# Patient Record
Sex: MALE | Race: BLACK OR AFRICAN AMERICAN | Employment: UNEMPLOYED | URBAN - METROPOLITAN AREA
[De-identification: names, ages, dates, MRNs, and addresses within clinical notes are randomized per-mention and may not be internally consistent; named-entity substitution may affect disease eponyms.]

---

## 2019-11-13 ENCOUNTER — HOSPITAL ENCOUNTER (OUTPATIENT)
Dept: PHYSICAL THERAPY | Age: 29
Discharge: HOME | End: 2019-11-13
Payer: MEDICARE

## 2019-11-13 PROCEDURE — 97162 PT EVAL MOD COMPLEX 30 MIN: CPT

## 2019-11-13 PROCEDURE — 97110 THERAPEUTIC EXERCISES: CPT

## 2019-11-13 NOTE — PROGRESS NOTES
PT CERVICAL EVAL AND TREATMENT     Patient Name: Iain Arnold  Date:2019  : 1990  [x]  Patient  Verified  Payor: [employer] / Plan: [employer] / Product Type: Managed Care Medicaid /    In time:1150  Out time:1240  Total Treatment Time (min): 50  Visit #: 1 of     Treatment Area: Acute neck pain [M54.2]  Acute low back pain [M54.5]    SUBJECTIVE  Pain Level (0-10 scale): (C): 4 (B): 4 (W):  9  Any medication changes, allergies to medications, diagnosis change, or new procedure performed: see summary sheet for update    Chief Complaint:  Patient reports with co neck and back pain and tightness associated with falling onto his back in 2019 after getting dizzy. Patient reports doctor associates dizziness with dehydration as patient denies chronic dizziness. Patient reports he was prescribed pain meds, but with no change in pain therefore he doesn't utilize them. Patient denies another forms of pain management such as heat , ice or exercise. Patient denies frequent falls. Patient reports resolving headaches (chronic) and rib pain since fall. Patient is own historian, but lives at home with mother. Functional Deficits increased time to complete ADLs, pain with walking and lifting, pain with prolonged sitting to watch TV,  Play games etc.     OBJECTIVE  Headaches: Do you have headaches?  Yes, intermittent   Posture: CS fwd head and shoulder , TS increased kyphosis - poor seated posture     40 deg fwd head posture     Shoulder/Scapular Screen: WFL throughout - co tension at end range     Active Movements:   ROM % CS  % LS    Forward flexion Chin to chest  [place] throughout - tension and pain note   Extension 20 deg     SB right  45     SB left  45    Rotation right 30    Rotation left 30      AROM - Thoracic Spine: seated rotation - dec 25%     Palpation: TTP- B UT, CS paraspinals , L 2-4 LS paraspinals   Muscle Flexibility: fair CS    Hyper flexible quads     HS 90/90 + at 15 deg lacking   Global Muscular Weakness:    Shoulder : Flexion 4/5, ABD 4/5, ER 4/5, IR 4/5    Middle Trapezius/ Rhomboids 3+/5   Lower Trapezius 3/5  Bridge - dec 25% - co tension   Hip flexion: 5/5, ABD 4/5, extension 4+/5     Patient Education/ Therapeutic Exercise : [x] Discussed POT including PT expectation, established HEP with pictures and instruction, per FS - UBE with S sec to first attempt   (minutes) : 20    Manual: NT     Modality (rationale): decrease post treatment soreness   [x]  MHP to CS and LS 10 min     Pain Level (0-10 scale) post treatment: 4    ASSESSMENT  [x]  See Plan of Care    PLAN  [x]  Upgrade activities as tolerated      [x] Other:_POC  2-3 x 8-12    Angela Melgoza, PT 11/13/2019    Justification for Eval Code Complexity:  Patient History : posture   Examination see exam   Clinical Presentation: evolving sec to multiple spinal levels involved   Clinical Decision Making : FOTO : 51 /100

## 2019-11-13 NOTE — PROGRESS NOTES
[address] [facility] AT [address]  Phone: [phone] Fax: [phone] / STATEMENT OF MEDICAL NECESSITY FOR PHYSICAL THERAPY SERVICES  Patient Name: Jo Solitario : 1990   Medical   Diagnosis: Acute neck pain [M54.2]  Acute low back pain [M54.5] Treatment Diagnosis: Postural dysfunction - CS and LS pain    Onset Date: approx 2019      Referral Source: Michelle Arriaga MD [facility]): 2019   Prior Hospitalization: See medical history Provider #: [id number]   Prior Level of Function: Functional I    Comorbidities: Depression    Medications: Verified on Patient Summary List   The Plan of Care and following information is based on the information from the initial evaluation.   ========================================================================  Assessment / key information:  Pt is a 30y.o. year old male who presents with co neck and back pain and tightness associated with falling onto his back in 2019 after getting dizzy. Patient reports doctor associates dizziness with dehydration as patient denies chronic dizziness. Patient reports he was prescribed pain meds, but with no change in pain therefore he doesn't utilize them. Patient denies another forms of pain management such as heat , ice or exercise. Patient denies frequent falls. Patient reports resolving headaches (chronic) and rib pain since fall. Patient is own historian, but lives at home with mother. Current deficits include: increased pain to 9/10 at worst and 4/10 at best,   OBJECTIVE  Headaches: Do you have headaches?  Yes, intermittent   Posture: CS fwd head and shoulder , TS increased kyphosis - poor seated posture                40 deg fwd head posture      Shoulder/Scapular Screen: WFL throughout - co tension at end range      Active Movements:   ROM % CS  % LS    Forward flexion Chin to chest  [place] throughout - tension and pain note   Extension 20 deg      SB right  45      SB left  45     Rotation right 30     Rotation left 30        AROM - Thoracic Spine: seated rotation - dec 25%      Palpation: TTP- B UT, CS paraspinals , L 2-4 LS paraspinals   Muscle Flexibility: fair CS                          Hyper flexible quads                           HS 90/90 + at 15 deg lacking   Global Muscular Weakness:               Shoulder : Flexion 4/5, ABD 4/5, ER 4/5, IR 4/5               Middle Trapezius/ Rhomboids 3+/5              Lower Trapezius 3/5  Bridge - dec 25% - co tension   Hip flexion: 5/5, ABD 4/5, extension 4+/5   Functional deficits include: increased time to complete ADLs, pain with walking and lifting, pain with prolonged sitting to watch TV,  Play games etc. . Home exercise program initiated on initial evaluation to address these deficits.  Pt would benefit from PT to address these deficits for increased functional mobility and QOL.  ========================================================================  Eval Complexity: History: MEDIUM  Complexity : 1-2 comorbidities / personal factors will impact the outcome/ POC Exam:HIGH Complexity : 4+ Standardized tests and measures addressing body structure, function, activity limitation and / or participation in recreation  Presentation: MEDIUM Complexity : Evolving with changing characteristics  Clinical Decision Making:MEDIUM Complexity : FOTO score of 26-74Overall Complexity:MEDIUM  Problem List: pain affecting function, decrease ROM, decrease strength, decrease ADL/ functional abilitiies, decrease activity tolerance, decrease flexibility/ joint mobility and decrease transfer abilities   Treatment Plan may include any combination of the following: Therapeutic exercise, Therapeutic activities, Neuromuscular re-education, Physical agent/modality, Gait/balance training, Manual therapy, Aquatic therapy, Patient education, Self Care training, Functional mobility training, Home safety training and Stair training  Patient / Family readiness to learn indicated by: asking questions, trying to perform skills and interest  Persons(s) to be included in education: patient (P)  Barriers to Learning/Limitations: None  Measures taken:    Patient Goal (s): \"get rid of pain \"   Patient self reported health status: good  Rehabilitation Potential: good   Short Term Goals: To be accomplished in  1  weeks:  1. Pt will be independent and compliant with HEP   Long Term Goals: To be accomplished in  8-12  treatments:  1. Patient will increase FOTO score to 59/100 for indications of increased functional mobility. 2.  Patient will demo CS extension to 30 deg for improved posture with prolonged sitting   3. Patient will demo 100% bridge for improved core and glut recruitment with bending   4. Patient will demo 4/5 MT strength for improved postural strength to decreased pain with lifting   Frequency / Duration:   Patient to be seen  2-3  times per week for 8-12  treatments:  Patient / Caregiver education and instruction: self care, activity modification and exercises  Therapist Signature: Leticia Thomas PT Date: 46/09/3459   Certification Period: NA Time: 102p    ========================================================================  I certify that the above Physical Therapy Services are being furnished while the patient is under my care. I agree with the treatment plan and certify that this therapy is necessary. Physician Signature:        Date:       Time:   Please sign and return to [facility] or you may fax the signed copy to [phone]. Thank you.

## 2019-11-26 ENCOUNTER — APPOINTMENT (OUTPATIENT)
Dept: PHYSICAL THERAPY | Age: 29
End: 2019-11-26
Payer: MEDICARE

## 2019-11-27 ENCOUNTER — APPOINTMENT (OUTPATIENT)
Dept: PHYSICAL THERAPY | Age: 29
End: 2019-11-27
Payer: MEDICARE

## 2019-12-03 ENCOUNTER — HOSPITAL ENCOUNTER (OUTPATIENT)
Dept: PHYSICAL THERAPY | Age: 29
Discharge: HOME | End: 2019-12-03
Payer: MEDICARE

## 2019-12-03 PROCEDURE — 97110 THERAPEUTIC EXERCISES: CPT | Performed by: PHYSICAL THERAPIST

## 2019-12-03 PROCEDURE — 97140 MANUAL THERAPY 1/> REGIONS: CPT | Performed by: PHYSICAL THERAPIST

## 2019-12-03 NOTE — PROGRESS NOTES
PT DAILY TREATMENT NOTE     Patient Name: Tyra Arias  Date:12/3/2019  : 1990  [x]  Patient  Verified  Payor: Saray Rangel / Plan: [employer] [place] / Product Type: Managed Care Medicaid /    In time:1059am  Out time:1155  Total Treatment Time (min): 56min  Total Timed Codes (min): 41  1:1 Treatment Time (min): na   Visit #: 2 of     Treatment Area: Acute neck pain [M54.2]  Acute low back pain [M54.5]    SUBJECTIVE  Pain Level (0-10 scale): 3  Any medication changes, allergies to medications, adverse drug reactions, diagnosis change, or new procedure performed?: [x] No    [] Yes (see summary sheet for update)  Subjective functional status/changes:   [] No changes reported  Having neck pain right now.      OBJECTIVE  Modality rationale: decrease inflammation, decrease pain and increase tissue extensibility to improve the patients ability to perform functional mobility and improve activity  endurance     Min Type Additional Details    [] Estim: []Att   []Unatt  []TENS instruct                 []IFC  []Premod []NMES                       []Other:  []w/US   []w/ice   []w/heat  Position:  Location:    []  Traction: [] Cervical       []Lumbar                       [] Prone          []Supine                       []Intermittent   []Continuous Lbs:  [] before manual  [] after manual    []  Ultrasound: []Continuous   [] Pulsed                           []1MHz   []3MHz Location:  W/cm2:    []  Iontophoresis with dexamethasone         Location: [] Take home patch   [] In clinic   10 []  Ice     [x]  heat  []  Ice massage Position:reclined  Location:c/s, l/s    []  Vasopneumatic Device Pressure: [] lo [] med [] hi   Temp: [] lo [] med [] hi   [] Skin assessment post-treatment:  []intact []redness- no adverse reaction       []redness - adverse reaction:       36/31 min Therapeutic Exercise:  [] See flow sheet :   Rationale: increase ROM, increase strength and increase proprioception to improve the patients ability to perform functional mobility and improve activity  endurance      10 min Manual Therapy:  C/s PROM, stretching UT,   Rationale: decrease pain, increase ROM, increase tissue extensibility, decrease trigger points and increase postural awareness to perform functional mobility and improve activity  endurance            x min Patient Education: [x] Review HEP    [] Progressed/Changed HEP based on:   [] positioning   [] body mechanics   [] transfers   [] heat/ice application        Other Objective/Functional Measures: initiated POC  Increased  pain noted in t/s with L rotation, but ROM is WNL. Very poor sitting posture with frequent cue to correct    Chin tucks with TC for correct form  Waiters pose at wall for cues for c/s posture (head touching wall)    Pain Level (0-10 scale) post treatment: 0    ASSESSMENT/Changes in Function: Good tolerance to treatment today with patient req 100% verbal/tactile cueing and demo for proper form/technique with all newly introduced therex. Pt continues with increased resting mm tension and requires frenquent cues for diaph. Breathing and relaxation of UT mm. Adjusted some therex from [address] for increased postural cues. Patient will continue to benefit from skilled PT services to modify and progress therapeutic interventions, address functional mobility deficits, address ROM deficits, address strength deficits, analyze and address soft tissue restrictions, analyze and cue movement patterns, analyze and modify body mechanics/ergonomics, assess and modify postural abnormalities, address imbalance/dizziness and instruct in home and community integration to attain remaining goals. []  See Plan of Care  []  See progress note/recertification  []  See Discharge Summary         Progress towards goals / Updated goals:  FIRST FU  · Short Term Goals: To be accomplished in  1  weeks:  1. Pt will be independent and compliant with HEP  · Long Term Goals:  To be accomplished in  8-12  treatments:  1. Patient will increase FOTO score to 59/100 for indications of increased functional mobility. 2.  Patient will demo CS extension to 30 deg for improved posture with prolonged sitting   3. Patient will demo 100% bridge for improved core and glut recruitment with bending   4.  Patient will demo 4/5 MT strength for improved postural strength to decreased pain with lifting   PLAN  [x]  Upgrade activities as tolerated     [x]  Continue plan of care  []  Update interventions per flow sheet       []  Discharge due to:_  []  Other:_      Katherine Thompson, PT 12/3/2019  9:21 AM

## 2019-12-05 ENCOUNTER — HOSPITAL ENCOUNTER (OUTPATIENT)
Dept: PHYSICAL THERAPY | Age: 29
Discharge: HOME | End: 2019-12-05
Payer: MEDICARE

## 2019-12-05 PROCEDURE — 97110 THERAPEUTIC EXERCISES: CPT

## 2019-12-05 PROCEDURE — 97140 MANUAL THERAPY 1/> REGIONS: CPT

## 2019-12-05 NOTE — PROGRESS NOTES
PT DAILY TREATMENT NOTE     Patient Name: Caroline George  Date:2019  : 1990  [x]  Patient  Verified  Payor: Bettina Aleman / Plan: Adelina Garzon / Product Type: Managed Care Medicaid /    In time: 10:50 am         Out time: 11:53 am  Total Treatment Time (min): 63  Visit #: 3 of     Treatment Area: Acute neck pain [M54.2]  Acute low back pain [M54.5]    SUBJECTIVE  Pain Level (0-10 scale): 4  Any medication changes, allergies to medications, adverse drug reactions, diagnosis change, or new procedure performed?: [x] No    [] Yes (see summary sheet for update)  Subjective functional status/changes:   [] No changes reported  Patient reports first f/u treatment did not cause any adverse reaction, but was surprised at how difficult it was to perform the exercises.     OBJECTIVE  Modality rationale: decrease inflammation, decrease pain and increase tissue extensibility to improve the patients ability to perform functional mobility and improve activity  endurance     Min Type Additional Details    [] Estim: []Att   []Unatt  []TENS instruct                 []IFC  []Premod []NMES                       []Other:  []w/US   []w/ice   []w/heat  Position:  Location:    []  Traction: [] Cervical       []Lumbar                       [] Prone          []Supine                       []Intermittent   []Continuous Lbs:  [] before manual  [] after manual    []  Ultrasound: []Continuous   [] Pulsed                           []1MHz   []3MHz Location:  W/cm2:    []  Iontophoresis with dexamethasone         Location: [] Take home patch   [] In clinic   10 []  Ice     [x]  Heat  []  Ice massage Position:reclined  Location:c/s, l/s    []  Vasopneumatic Device Pressure: [] lo [] med [] hi   Temp: [] lo [] med [] hi   [x] Skin assessment post-treatment:  [x]intact []redness- no adverse reaction       []redness - adverse reaction:     41 min Therapeutic Exercise:  [x] See flow sheet: added BET sit<>stand, deadbugs Rationale: increase ROM, increase strength and increase proprioception to improve the patients ability to perform functional mobility and improve activity  endurance    12 min Manual Therapy:  prone PA mobs grade III to T/S f/b STM to TPS at T6-8; DTM to (B) UT f/b SOR and C/S PROM for rotation   Rationale: decrease pain, increase ROM, increase tissue extensibility, decrease trigger points and increase postural awareness to perform functional mobility and improve activity  endurance          X min Patient Education: [x] Review HEP     Other Objective/Functional Measures:  VCs for tidal breathing with bridges, ankles in DF to inc glut activation. Core strength: 80% bridge  C/S AROM: extension 44 deg, rotation (supine) WNL    Pain Level (0-10 scale) post treatment: 0    ASSESSMENT/Changes in Function:   Good progress towards set goals. Patient will continue to benefit from skilled PT services to modify and progress therapeutic interventions, address functional mobility deficits, address ROM deficits, address strength deficits, analyze and address soft tissue restrictions, analyze and cue movement patterns, analyze and modify body mechanics/ergonomics, assess and modify postural abnormalities, address imbalance/dizziness and instruct in home and community integration to attain remaining goals. [x]  See Plan of Care  []  See progress note/recertification  []  See Discharge Summary         Progress towards goals / Updated goals: · Short Term Goals: To be accomplished in  1  weeks:  1. Patient will be independent and compliant with HEP. -Goal met; pt notes compliance (12/5/19)  · Long Term Goals: To be accomplished in  8-12  treatments:  1. Patient will increase FOTO score to 59/100 for indications of increased functional mobility. 2.  Patient will demo CS extension to 30 deg for improved posture with prolonged sitting. -Goal met; pt demos 44 deg C/S extension with stiffness at end-range (12/5/19)   3. Patient will demo 100% bridge for improved core and glut recruitment with bending. -Goal progressing; 80% bridge (12/5/19)   4.   Patient will demo 4/5 MT strength for improved postural strength to decreased pain with lifting     PLAN  [x]  Upgrade activities as tolerated     [x]  Continue plan of care  []  Update interventions per flow sheet       []  Discharge due to:_  []  Other:_      Corrinne Ridge, NEVIN 12/5/2019

## 2019-12-09 ENCOUNTER — APPOINTMENT (OUTPATIENT)
Dept: PHYSICAL THERAPY | Age: 29
End: 2019-12-09
Payer: MEDICARE

## 2019-12-11 ENCOUNTER — HOSPITAL ENCOUNTER (OUTPATIENT)
Dept: PHYSICAL THERAPY | Age: 29
Discharge: HOME | End: 2019-12-11
Payer: MEDICARE

## 2019-12-11 PROCEDURE — 97110 THERAPEUTIC EXERCISES: CPT

## 2019-12-11 PROCEDURE — 97140 MANUAL THERAPY 1/> REGIONS: CPT

## 2019-12-11 NOTE — PROGRESS NOTES
PT DAILY TREATMENT NOTE     Patient Name: Desire Hampton  Date:2019  : 1990  [x]  Patient  Verified  Payor: Saul Begin / Plan: [id number] / Product Type: Managed Care Medicaid /    In time: 11:23 am         Out time: 12:13 pm  Total Treatment Time (min): 50  Visit #: 4 of     Treatment Area: Acute neck pain [M54.2]  Acute low back pain [M54.5]    SUBJECTIVE  Pain Level (0-10 scale): 2  Any medication changes, allergies to medications, adverse drug reactions, diagnosis change, or new procedure performed?: [x] No    [] Yes (see summary sheet for update)  Subjective functional status/changes:   [] No changes reported  \"I feel better when I am not here, because I feel pain when I leave here afterwards. \"    OBJECTIVE  Modality rationale: decrease inflammation, decrease pain and increase tissue extensibility to improve the patients ability to perform functional mobility and improve activity  endurance     Min Type Additional Details    [] Estim: []Att   []Unatt  []TENS instruct                 []IFC  []Premod []NMES                       []Other:  []w/US   []w/ice   []w/heat  Position:  Location:    []  Traction: [] Cervical       []Lumbar                       [] Prone          []Supine                       []Intermittent   []Continuous Lbs:  [] before manual  [] after manual    []  Ultrasound: []Continuous   [] Pulsed                           []1MHz   []3MHz Location:  W/cm2:    []  Iontophoresis with dexamethasone         Location: [] Take home patch   [] In clinic   10 []  Ice     [x]  Heat  []  Ice massage Position:reclined  Location:c/s, l/s    []  Vasopneumatic Device Pressure: [] lo [] med [] hi   Temp: [] lo [] med [] hi   [x] Skin assessment post-treatment:  [x]intact []redness- no adverse reaction       []redness - adverse reaction:     30 min Therapeutic Exercise:  [x] See flow sheet: pt arrived 23 minutes late, agreeable to abbreviated session   Rationale: increase ROM, increase strength and increase proprioception to improve the patients ability to perform functional mobility and improve activity  endurance    10 min Manual Therapy:  STM to TPS at T6-8; DTM to (B) UT f/b SOR; reassessment   Rationale: decrease pain, increase ROM, increase tissue extensibility, decrease trigger points and increase postural awareness to perform functional mobility and improve activity  endurance          X min Patient Education: [x] Review HEP     Other Objective/Functional Measures:  Subjective improvement of 40-45% in symptoms since IE reported. Improvements: prolonged walking, stair negotiation, light to moderate lifting (ie. pt notes lifting 50 lb dog without difficulty)  Deficits: heavier lifting/carrying, posture  FOTO score: 64/100 (at last assessment, 51/100)  C/S AROM: flex chin to chest, ext 38 deg, (B)SB 40 deg, (B) rot 100% AROM  (B) shoulder AROM: WFL in all directions  Scapular strength: MT (R) 4/5, (L) 4+/5  Core strength 100% bridge  Hip strength: flex (R) 4/5 (L) 4+/5, abd (R) 4+/5, (L) 5/5  Posture: FHRS  Pain: (A) 0-3    Pain Level (0-10 scale) post treatment: 0    ASSESSMENT/Changes in Function:   Discussed pain versus delayed-onset muscle soreness as pt notes discomfort presents several hours to days post-treatment. See PN; will cont for remaining scheduled treatment, then D/C to HEP. Patient will continue to benefit from skilled PT services to modify and progress therapeutic interventions, address functional mobility deficits, address ROM deficits, address strength deficits, analyze and address soft tissue restrictions, analyze and cue movement patterns, analyze and modify body mechanics/ergonomics, assess and modify postural abnormalities, address imbalance/dizziness and instruct in home and community integration to attain remaining goals.      []  See Plan of Care  [x]  See progress note/recertification (63/10/05)  []  See Discharge Summary         Progress towards goals / Updated goals:  1. Patient will increase FOTO score to 59/100 for indications of increased functional mobility. -Goal met; inc to 64/100   2. Patient will demo CS extension to 30 deg for improved posture with prolonged sitting. -Goal met; pt demos 44 deg C/S extension with stiffness at end-range (12/5/19)   3. Patient will demo 100% bridge for improved core and glut recruitment with bending. -Goal met; 100% bridge (12/5/19)   4.   Patient will demo 4/5 MT strength for improved postural strength to decreased pain with lifting. -Goal met; pt demos MT strength (R) 4/5, (L) 4+/5  PLAN  [x]  Upgrade activities as tolerated     [x]  Continue plan of care  []  Update interventions per flow sheet       []  Discharge due to:_  [x]  Other: see PN; cont for remaining scheduled appt to emphasize/finalize HEP for scapular and glut/core strength, then D/C    Karilyn Siemens, PTA 12/11/2019

## 2019-12-11 NOTE — PROGRESS NOTES
[address] [facility] AT [address] [facility], [place] [address]  Phone: [phone] Fax: [phone]  PROGRESS NOTE / [address]  Patient Name: Isaiah Lopez : 1990   Treatment/Medical Diagnosis: Acute neck pain [M54.2]  Acute low back pain [M54.5]   Referral Source: Hubert Albright MD     Date of Initial Visit: 19 Attended Visits: 4 Missed Visits: 1     SUMMARY OF TREATMENT  Pt is a 30y.o. year old male who presents with co neck and back pain and tightness associated with falling onto his back in 2019 after getting dizzy. Treatment has consisted of the following:  Therapeutic exercise, Neuromuscular re-education, Physical agent/modality, Gait/balance training, Manual therapy, Patient education, and Self Care training. CURRENT STATUS  Patient has made slow, steady progress in PT, as evidenced by increased C/S mobility, improve scapular strength, and reduced overall pain levels. Patient states his headaches have improved, which occur only 2x weekly for short durations. Patient notes his main difficulty is maintaining proper posture. Assessment as follows:  Subjective improvement of 40-45% in symptoms since IE reported.   Improvements: prolonged walking, stair negotiation, light to moderate lifting (ie. pt notes lifting 50 lb dog without difficulty)  Deficits: heavier lifting/carrying, posture  FOTO score: 64/100 (at last assessment, 51/100)  C/S AROM: flex chin to chest, ext 38 deg, (B)SB 40 deg, (B) rot 100% AROM  (B) shoulder AROM: WFL in all directions  Scapular strength: MT (R) 4/5, (L) 4+/5; LT 3/5 bilaterally  Core strength 100% bridge  Hip strength: flex (R) 4/5 (L) 4+/5, abd (R) 4+/5, (L) 5/5  Posture: FHRS  Pain: (A) 0-3    Goal/Measure of Progress   1.  Patient will increase FOTO score to 59/100 for indications of increased functional mobility. -Goal met; inc to 64/100   2.  Patient will demo CS extension to 30 deg for improved posture with prolonged sitting. -Goal met; pt demos 44 deg C/S extension with stiffness at end-range (12/5/19)   3.  Patient will demo 100% bridge for improved core and glut recruitment with bending. -Goal met; 100% bridge (12/5/19)   4.  Patient will demo 4/5 MT strength for improved postural strength to decreased pain with lifting. -Goal met; pt demos MT strength (R) 4/5, (L) 4+/5    New Goals to be achieved in __1__  treatments:  1. Patient will be (I) with finalized HEP in preparation for D/C.     RECOMMENDATIONS  Recommend cont skilled PT for remaining scheduled appt to address strength deficits, achieve stated goals, and prepare patient for D/C to HEP. If you have any questions/comments please contact us directly at [phone]. Thank you for allowing us to assist in the care of your patient. LPTA Signature: Nir Hayward, [place]  Date: 12/11/2019   PT Signature: Rebekah Benedict DPT Time: 1:07 PM   NOTE TO PHYSICIAN:  PLEASE COMPLETE THE ORDERS BELOW AND FAX TO   [facility]: [phone]. If you are unable to process this request in 24 hours please contact our office: [phone].  ___ I have read the above report and request that my patient continue as recommended.   ___ I have read the above report and request that my patient continue therapy with the following changes/special instructions:_________________________________________________________   ___ I have read the above report and request that my patient be discharged from therapy.      Physician Signature:        Date:       Time:

## 2019-12-16 ENCOUNTER — HOSPITAL ENCOUNTER (OUTPATIENT)
Dept: PHYSICAL THERAPY | Age: 29
Discharge: HOME | End: 2019-12-16
Payer: MEDICARE

## 2019-12-16 PROCEDURE — 97140 MANUAL THERAPY 1/> REGIONS: CPT | Performed by: GENERAL ACUTE CARE HOSPITAL

## 2019-12-16 PROCEDURE — 97110 THERAPEUTIC EXERCISES: CPT | Performed by: GENERAL ACUTE CARE HOSPITAL

## 2019-12-16 NOTE — PROGRESS NOTES
[address] [facility] [address] [facility] AT [address] Riky Christianson, [place], [place] [address]  Phone: [phone] Fax [phone] SUMMARY  Patient Name: Ann-Marie Solis : 1990   Treatment/Medical Diagnosis: Acute neck pain [M54.2]  Acute low back pain [M54.5]   Referral Source: Laquita Freitas MD     Date of Initial Visit: 19 Attended Visits: 5 Missed Visits: 1     SUMMARY OF TREATMENT  Pt is a 30y.o. year old male who presents with co neck and back pain and tightness associated with falling onto his back in 2019 after getting dizzy. Treatment has consisted of the following:  Therapeutic exercise, Neuromuscular re-education, Physical agent/modality, Gait/balance training, Manual therapy, Patient education, and Self Care training.     CURRENT STATUS  Patient has made slow, steady progress in PT, as evidenced by increased C/S mobility, improve scapular strength, and reduced overall pain levels. Patient states his headaches have improved, which occur only 2x weekly for short durations. Patient notes his main difficulty is maintaining proper posture. Assessment as follows   Subjective improvement of 40-45% in symptoms since IE reported. Improvements: prolonged walking, stair negotiation, light to moderate lifting (ie. pt notes lifting 50 lb dog without difficulty)  Deficits: heavier lifting/carrying, posture  FOTO score: 64/100 (at last assessment, 51/100)  C/S AROM: flex chin to chest, ext 38 deg, (B)SB 40 deg, (B) rot 100% AROM  (B) shoulder AROM: WFL in all directions  Scapular strength: MT (R) 4/5, (L) 4+/5  Core strength 100% bridge  Hip strength: flex (R) 4/5 (L) 4+/5, abd (R) 4+/5, (L) 5/5  Posture: FHRS  Pain: (A) 0-3    Updated Goals:  1. Patient will be (I) with finalized HEP in preparation for D/C.  Met; pt demonstrates proper performance all exercises on HEP 19    Previous Goals:   1.  Patient will increase FOTO score to 59/100 for indications of increased functional mobility. -Goal met; inc to 64/100   2.  Patient will demo CS extension to 30 deg for improved posture with prolonged sitting. -Goal met; pt demos 44 deg C/S extension with stiffness at end-range (12/5/19)   3.  Patient will demo 100% bridge for improved core and glut recruitment with bending. -Goal met; 100% bridge (12/5/19)   4.  Patient will demo 4/5 MT strength for improved postural strength to decreased pain with lifting. -Goal met; pt demos MT strength (R) 4/5, (L) 4+/5    RECOMMENDATIONS  Discontinue therapy. Progressing towards or have reached established goals. If you have any questions/comments please contact us directly at [phone]. Thank you for allowing us to assist in the care of your patient.   Therapist Signature: Josué Ansari PT Date: 12/16/19   Reporting Period: 11/13/19 to 12/16/19 Time: 11:13 AM

## 2019-12-16 NOTE — PROGRESS NOTES
PT DAILY TREATMENT NOTE     Patient Name: Jorge Macdonald  Date:2019  : 1990  [x]  Patient  Verified  Payor: Rosy Ronquillo / Plan: Raji Sanchez / Product Type: Managed Care Medicaid /    In time: 11:00 am          Out time: 11:51  Total Treatment Time (min): 51  Visit #: 1 of 1    Treatment Area: Acute neck pain [M54.2]  Acute low back pain [M54.5]    SUBJECTIVE  Pain Level (0-10 scale): 3/10  Any medication changes, allergies to medications, adverse drug reactions, diagnosis change, or new procedure performed?: [x] No    [] Yes (see summary sheet for update)  Subjective functional status/changes:   [] No changes reported  Pt reports feeling tired today and that his ride to therapy this morning was \"rough\" due to being bumpy. Feels okay with DC today.      OBJECTIVE  Modality rationale: decrease inflammation, decrease pain and increase tissue extensibility to improve the patients ability to perform functional mobility and improve activity  endurance     Min Type Additional Details    [] Estim: []Att   []Unatt  []TENS instruct                 []IFC  []Premod []NMES                       []Other:  []w/US   []w/ice   []w/heat  Position:  Location:    []  Traction: [] Cervical       []Lumbar                       [] Prone          []Supine                       []Intermittent   []Continuous Lbs:  [] before manual  [] after manual    []  Ultrasound: []Continuous   [] Pulsed                           []1MHz   []3MHz Location:  W/cm2:    []  Iontophoresis with dexamethasone         Location: [] Take home patch   [] In clinic   10 []  Ice     [x]  Heat  []  Ice massage Position:supine  Location:c/s, l/s    []  Vasopneumatic Device Pressure: [] lo [] med [] hi   Temp: [] lo [] med [] hi   [x] Skin assessment post-treatment:  [x]intact []redness- no adverse reaction       []redness - adverse reaction:     31 min Therapeutic Exercise:  [x] See flow sheet: Reviewed all exercises to be performed for HEP to ensure competence/proper performance. Rationale: increase ROM, increase strength and increase proprioception to improve the patients ability to perform functional mobility and improve activity  endurance     10 min Manual Therapy:  STM to (B) UT and CS paraspinals f/b SOR    Rationale: decrease pain, increase ROM, increase tissue extensibility, decrease trigger points and increase postural awareness to perform functional mobility and improve activity  endurance          X min Patient Education: [x] Review HEP  Provided patient with updated HEP per exercises on FS. Given print out with pictures and GTB for resistance training at home. Other Objective/Functional Measures:    Pain Level (0-10 scale) post treatment: 0    ASSESSMENT/Changes in Function:   See DC summary. Patient will continue to benefit from skilled PT services to modify and progress therapeutic interventions, address functional mobility deficits, address ROM deficits, address strength deficits, analyze and address soft tissue restrictions, analyze and cue movement patterns, analyze and modify body mechanics/ergonomics, assess and modify postural abnormalities, address imbalance/dizziness and instruct in home and community integration to attain remaining goals. []  See Plan of Care  []  See progress note/recertification   [x]  See Discharge Summary         Progress towards goals / Updated goals:  1. Patient will be (I) with finalized HEP in preparation for D/C. Met; competent all current exercise program 12/16/19    Previous goals:   1. Patient will increase FOTO score to 59/100 for indications of increased functional mobility. -Goal met; inc to 64/100   2. Patient will demo CS extension to 30 deg for improved posture with prolonged sitting. -Goal met; pt demos 44 deg C/S extension with stiffness at end-range (12/5/19)   3.   Patient will demo 100% bridge for improved core and glut recruitment with bending. -Goal met; 100% bridge (12/5/19)   4.   Patient will demo 4/5 MT strength for improved postural strength to decreased pain with lifting. -Goal met; pt demos MT strength (R) 4/5, (L) 4+/5  PLAN  []  Upgrade activities as tolerated     []  Continue plan of care  []  Update interventions per flow sheet       [x]  Discharge due to:_ has met all established goals    []  Other:     Jossy Cuello, PT 12/16/2019

## 2020-02-05 ENCOUNTER — HOSPITAL ENCOUNTER (OUTPATIENT)
Dept: PHYSICAL THERAPY | Age: 30
Discharge: HOME | End: 2020-02-05
Payer: MEDICARE

## 2020-02-05 PROCEDURE — 97035 APP MDLTY 1+ULTRASOUND EA 15: CPT

## 2020-02-05 PROCEDURE — 97110 THERAPEUTIC EXERCISES: CPT

## 2020-02-05 PROCEDURE — 97162 PT EVAL MOD COMPLEX 30 MIN: CPT

## 2020-02-05 NOTE — PROGRESS NOTES
PT KNEE / HIP EVAL AND TREATMENT    Patient Name: Sarmad Churchill  Date:2020  : 1990  [x]  Patient  Verified  Payor: Yann Ann / Plan: Shaila Leaver / Product Type: Managed Care Medicaid /    In time:1213  Out time:1251  Total Treatment Time (min): 48  Total timed codes: 23  1:1 times: 38   Visit #: 1 of     Treatment Area: Chronic pain of left knee [M25.562, G89.29]    SUBJECTIVE  Pain Level (0-10 scale): (C):4  (B):1  (W):  9  Any medication changes, allergies to medications, diagnosis change, or new procedure performed: see summary sheet for update  Subjective functional status/changes:  CHIEF COMPLAINT: Patient reports with co L anterior knee pain, weakness and tenderness starting over 10+ years ago after getting his leg hit by a truck door and patient reports progressive worsening over the past few years. Pain management via wrapping with ace bandage and elevate with rest. Patient is pending xrays today. Hx of fall in January sec to knee buckling.    DEFICITS: walking, bending and lifting, intermittent sleep disruption, stair negotiation (4 DUNG), rec activities: walking dog    OBJECTIVE  Posture: Supine - B hip IR and squinting patella   Gait:  Antalgic     ROM :  Hip : flexion: 100 deg   Knee 0-136 with end range pain   Ankle: WFL , with noted GSC tightness (knee flexion with ankle DF)    Strength : R grossly 5/5   Hip: flexion 3+, ABD 3+ extension 3+  Knee: flexion 4, extension 3 with pain , quad lag : min   Core: bridge 100%, plank NT     Flexibility:   Hamstrings:  90/90 HS test +  Quadriceps: Fauzia Test negative   Gastroc:   +    Palpation: TTP all ant knee - peripatellar area   Edema around patella      Patella:  Patellar Positioning (Static) Squinting   Patellar Tracking WNL      Patellar Mobility WNL - guarded sec to pain        Other tests/comments:  Squat - 75 deg - unable to push to stand without UE assistance   SLS : 5 sec L, 30 sec R       Patient Education/ Therapeutic Exercise : [x] Discussed POT including PT expectation, established HEP (formulated while patient was on ice)  with pictures and instruction  (minutes) : 15    Manual:NI     Modality (rationale): decrease localized edema around patellar ligament to decrease pain with walking, stairs and general bending   [x]  US - pulsed 50% to L ant knee - 8 min with set up .8 w/cm2, 1.0 mghz  Ice 10 min (while PT formulated HEP )      Pain Level (0-10 scale) post treatment: 2  ASSESSMENT  [x]  See Plan of Care    PLAN  [x]  Upgrade activities as tolerated     [x] Other:_ POC  2-3 x 8-12    Carolina Burden, PT 2/5/2020     Justification for Eval Code Complexity:  Patient History : chronicity   Examination see exam   Clinical Presentation: evolving   Clinical Decision Making : FOTO : 30 /100

## 2020-02-05 NOTE — PROGRESS NOTES
[address] [facility] AT [address] [place], [place]  Phone: [phone] Fax: [phone] / [address] [place] [address] [facility]  Patient Name: Zulema Quispe : 1990   Medical   Diagnosis: Chronic pain of left knee [M25.562, G89.29] Treatment Diagnosis: L knee PFS    Onset Date: approx 10 years ago      Referral Source: Ignacia Valenzuela MD Start of Care [facility]): 2020   Prior Hospitalization: See medical history Provider #: [id number]   Prior Level of Function: Functional I, no AD, lives with mom   Comorbidities: Depression, possible MR    Medications: Verified on Patient Summary List   The Plan of Care and following information is based on the information from the initial evaluation.   ========================================================================  Assessment / key information:  Pt is a 30y.o. year old male who presents with co L anterior knee pain, weakness and tenderness starting over 10+ years ago after getting his leg hit by a truck door and patient reports progressive worsening over the past few years. Pain management via wrapping with ace bandage and elevate with rest. Patient is pending xrays today. Hx of fall in January sec to knee buckling.  .  Current deficits include: increased pain to 9/10 at worst and 1/10 at best,   Posture:         Supine - B hip IR and squinting patella   Gait:                Antalgic      ROM :  Hip : flexion: 100 deg   Knee 0-136 with end range pain   Ankle: WFL , with noted GSC tightness (knee flexion with ankle DF)     Strength : R grossly 5/5   Hip: flexion 3+, ABD 3+ extension 3+  Knee: flexion 4, extension 3 with pain , quad lag : min   Core: bridge 100%, plank NT      Flexibility:   Hamstrings:  90/90 HS test +  Quadriceps: Fauzia Test negative   Gastroc:   +     Palpation: TTP all ant knee - peripatellar area              Edema around patella    Patella:  Patellar Positioning (Static) Squinting   Patellar Tracking WNL      Patellar Mobility WNL - guarded sec to pain        Other tests/comments:  Squat - 75 deg - unable to push to stand without UE assistance   SLS : 5 sec L, 30 sec R     Functional deficits include: walking, bending and lifting, intermittent sleep disruption, stair negotiation (4 DUNG), rec activities: walking dog. Home exercise program initiated on initial evaluation to address these deficits.  Pt would benefit from PT to address these deficits for increased functional mobility and QOL.  ========================================================================  Eval Complexity: History: MEDIUM  Complexity : 1-2 comorbidities / personal factors will impact the outcome/ POC Exam:HIGH Complexity : 4+ Standardized tests and measures addressing body structure, function, activity limitation and / or participation in recreation  Presentation: MEDIUM Complexity : Evolving with changing characteristics  Clinical Decision Making:MEDIUM Complexity : FOTO score of 26-74Overall Complexity:MEDIUM  Problem List: pain affecting function, decrease ROM, decrease strength, edema affecting function, impaired gait/ balance, decrease ADL/ functional abilitiies, decrease activity tolerance, decrease flexibility/ joint mobility and decrease transfer abilities   Treatment Plan may include any combination of the following: Therapeutic exercise, Therapeutic activities, Neuromuscular re-education, Physical agent/modality, Gait/balance training, Manual therapy, Aquatic therapy, Patient education, Self Care training, Functional mobility training, Home safety training and Stair training  Patient / Family readiness to learn indicated by: asking questions, trying to perform skills and interest  Persons(s) to be included in education: patient (P)  Barriers to Learning/Limitations: None  Measures taken:    Patient Goal (s): \"pain relief \"   Patient self reported health status: good  Rehabilitation Potential: good   Short Term Goals: To be accomplished in  1  weeks:  1. Pt will be independent and compliant with HEP   Long Term Goals: To be accomplished in  8-12  treatments:  1. Patient will increase FOTO score to 59/100 for indications of increased functional mobility. 2.  Patient will demo 90 deg squat and stand for progression to bending activities around the home   3. Patient will demo 4/5 knee extension strength for ease with stair negotiation   4. Patient will demo 15 sec SLS for ease with SL ADLs with decreased fall risk   Frequency / Duration:   Patient to be seen  2-3  times per week for 8-12  treatments:  Patient / Caregiver education and instruction: self care, activity modification and exercises  Therapist Signature: Nitin Bower PT Date: 9/7/8954   Certification Period: NA Time: 3100D   ========================================================================  I certify that the above Physical Therapy Services are being furnished while the patient is under my care. I agree with the treatment plan and certify that this therapy is necessary. Physician Signature:        Date:       Time:   Please sign and return to [facility] or you may fax the signed copy to [phone]. Thank you.

## 2020-02-12 ENCOUNTER — HOSPITAL ENCOUNTER (OUTPATIENT)
Dept: PHYSICAL THERAPY | Age: 30
Discharge: HOME | End: 2020-02-12
Payer: MEDICARE

## 2020-02-12 PROCEDURE — 97110 THERAPEUTIC EXERCISES: CPT

## 2020-02-12 PROCEDURE — 97035 APP MDLTY 1+ULTRASOUND EA 15: CPT

## 2020-02-12 NOTE — PROGRESS NOTES
PT DAILY TREATMENT NOTE     Patient Name: Giuseppe Kirk  Date:2020  : 1990  [x]  Patient  Verified  Payor: [place] MEDICARE / Plan: [place] MEDICARE PART A & B / Product Type: Medicare /    In time:1130  Out time:1214  Total Treatment Time (min): 44  Total Timed Codes (min): 34  1:1 Treatment Time (min): 5280- 7270 (24)   Visit #: 2 of 8-10    Treatment Area: Chronic pain of left knee [M25.562, G89.29]    SUBJECTIVE  Pain Level (0-10 scale): 5-6  Any medication changes, allergies to medications, adverse drug reactions, diagnosis change, or new procedure performed?: [x] No    [] Yes (see summary sheet for update)  Subjective functional status/changes:   \"Pain\"    OBJECTIVE  Modality rationale: decrease inflammation and decrease pain to improve the patients ability to decrease pain with daily activities    Min Type Additional Details    [] Estim: []Att   []Unatt        []TENS instruct                  []IFC  []Premod   []NMES                     []Other:  []w/US   []w/ice   []w/heat  Position:  Location:    []  Traction: [] Cervical       []Lumbar                       [] Prone          []Supine                       []Intermittent   []Continuous Lbs:  [] before manual  [] after manual   8 [x]  Ultrasound: []Continuous   [x] Pulsed - 50%                           []1MHz   []3MHz Location: L knee  W/cm2: .8    []  Iontophoresis with dexamethasone         Location: [] Take home patch   [] In clinic   10 [x]  Ice     []  heat  []  Ice massage Position: long sitting   Location: L knee     []  Vasopneumatic Device Pressure:       [] lo [] med [] hi   Temperature: [] lo [] med [] hi   [x] Skin assessment post-treatment:  [x]intact []redness- no adverse reaction       []redness - adverse reaction:       26 min Therapeutic Exercise:  [x] See flow sheet :Initiated ther ex per flow sheet    Rationale: increase ROM, increase strength, improve coordination and improve balance to improve the patients ability to progress to increased activity tolerance with decreased pain     X with TE/MT min Patient Education: [x] Review HEP from IE        Other Objective/Functional Measures: Therex initiated today - first FU post eval.      Pain Level (0-10 scale) post treatment: 5    ASSESSMENT/Changes in Function: Patient tolerated initiation of therex fair sec to increased pain levels. CO ant knee and calf pain today. 100% VC for form and technique for all newly introduced therex. PT felt one occurrence of popping with knee flexion during TE - likely intermittent tracking concerns. Patient may benefit from more VMO strengthening . General poor quad tone overall however. Patient will continue to benefit from skilled PT services to modify and progress therapeutic interventions, address functional mobility deficits, address ROM deficits, address strength deficits, analyze and address soft tissue restrictions, analyze and cue movement patterns, analyze and modify body mechanics/ergonomics and assess and modify postural abnormalities to attain remaining goals. [x]  See Plan of Care  []  See progress note/recertification  []  See Discharge Summary         Progress towards goals / Updated goals:  FIRST FU TREATMENT - CONT PER POT TO EST GOALS 2/12/2020   · Short Term Goals: To be accomplished in  1  weeks:  1. Pt will be independent and compliant with HEP  · Long Term Goals: To be accomplished in  8-12  treatments:  1. Patient will increase FOTO score to 59/100 for indications of increased functional mobility. 2.  Patient will demo 90 deg squat and stand for progression to bending activities around the home   3. Patient will demo 4/5 knee extension strength for ease with stair negotiation   4.  Patient will demo 15 sec SLS for ease with SL ADLs with decreased fall risk     PLAN  [x]  Upgrade activities as tolerated     []  Continue plan of care  [x]  Update interventions per flow sheet       []  Discharge due to:_  [x] Other:_assess response to first FU treatment       Tacho Score, PT 2/12/2020

## 2020-02-18 ENCOUNTER — HOSPITAL ENCOUNTER (OUTPATIENT)
Dept: PHYSICAL THERAPY | Age: 30
Discharge: HOME | End: 2020-02-18
Payer: MEDICARE

## 2020-02-18 PROCEDURE — 97110 THERAPEUTIC EXERCISES: CPT

## 2020-02-18 PROCEDURE — 97035 APP MDLTY 1+ULTRASOUND EA 15: CPT

## 2020-02-18 NOTE — PROGRESS NOTES
PT DAILY TREATMENT NOTE     Patient Name: Dottie Jiménez  Date:2020  : 1990  [x]  Patient  Verified  Payor: [place] MED[place]ARE / Plan: [place] MEDICARE PART A & B / Product Type: Medicare /    In time: 11:00 am          Out time: 12:00 pm  Total Treatment Time (min): 60  Total Timed Codes (min): 50  1:1 Treatment Time (min): 11:22am-11:50am (33)  Visit #: 3 of 8-10    Treatment Area: Chronic pain of left knee [M25.562, G89.29]    SUBJECTIVE  Pain Level (0-10 scale): 5  Any medication changes, allergies to medications, adverse drug reactions, diagnosis change, or new procedure performed?: [x] No    [] Yes (see summary sheet for update)  Subjective functional status/changes:   Patient states he is still able to walk his normal distances to the grocery and to donate plasma, but requires additional time due to his slower pace. He states he does not use any AD for walking, but is considering using a cane. He notes x-ray results came back normal with no body abnormalities.     OBJECTIVE  Modality rationale: decrease inflammation and decrease pain to improve the patients ability to decrease pain with daily activities    Min Type Additional Details    [] Estim: []Att   []Unatt        []TENS instruct                  []IFC  []Premod   []NMES                     []Other:  []w/US   []w/ice   []w/heat  Position:  Location:    []  Traction: [] Cervical       []Lumbar                       [] Prone          []Supine                       []Intermittent   []Continuous Lbs:  [] before manual  [] after manual   8 [x]  Ultrasound: []Continuous   [x] Pulsed - 50%                           []1MHz   []3MHz Location: L knee  W/cm2: .8    []  Iontophoresis with dexamethasone         Location: [] Take home patch   [] In clinic   10 [x]  Ice     []  heat  []  Ice massage Position: long sitting   Location: L knee     []  Vasopneumatic Device Pressure:       [] lo [] med [] hi   Temperature: [] lo [] med [] hi   [x] Skin assessment post-treatment:  [x]intact []redness- no adverse reaction       []redness - adverse reaction:       42 min Therapeutic Exercise:  [x] See flow sheet: added standing HR/TR, MSR GT, foam ROM, and standing TKE with RTB   Rationale: increase ROM, increase strength, improve coordination and improve balance to improve the patients ability to progress to increased activity tolerance with decreased pain     X with TE/MT min Patient Education: [x] Review HEP; cont to encourage inc'd HEP compliance to progress strength reg'd to achieve patient's goals     Other Objective/Functional Measures:    Full TKE with LAQ, no c/o popping today. Good TKE with standing. MSR GT: (B) 30\" each    Pain Level (0-10 scale) post treatment: 6    ASSESSMENT/Changes in Function:   Patient demos poor tolerance to static standing challenges. Patient able to demo instances of normalized gait in clinic, but overall amb's with moderate antalgia. Decreased overall quad tone. Patient will continue to benefit from skilled PT services to modify and progress therapeutic interventions, address functional mobility deficits, address ROM deficits, address strength deficits, analyze and address soft tissue restrictions, analyze and cue movement patterns, analyze and modify body mechanics/ergonomics and assess and modify postural abnormalities to attain remaining goals. [x]  See Plan of Care  []  See progress note/recertification  []  See Discharge Summary         Progress towards goals / Updated goals: · Short Term Goals: To be accomplished in  1  weeks:  1. Pt will be independent and compliant with HEP  · Long Term Goals: To be accomplished in  8-12  treatments:  1. Patient will increase FOTO score to 59/100 for indications of increased functional mobility. 2.  Patient will demo 90 deg squat and stand for progression to bending activities around the home   3. Patient will demo 4/5 knee extension strength for ease with stair negotiation   4. Patient will demo 15 sec SLS for ease with SL ADLs with decreased fall risk.  -Goal progressing; MSR GT 30 seconds (2/18/20)     PLAN  [x]  Upgrade activities as tolerated     [x]  Continue plan of care  [x]  Update interventions per flow sheet       []  Discharge due to:_  []  Other:_    Jp Yates, PTA 2/18/2020

## 2020-02-20 ENCOUNTER — HOSPITAL ENCOUNTER (OUTPATIENT)
Dept: PHYSICAL THERAPY | Age: 30
Discharge: HOME | End: 2020-02-20
Payer: MEDICARE

## 2020-02-20 PROCEDURE — 97035 APP MDLTY 1+ULTRASOUND EA 15: CPT

## 2020-02-20 PROCEDURE — 97110 THERAPEUTIC EXERCISES: CPT

## 2020-02-20 NOTE — PROGRESS NOTES
PT DAILY TREATMENT NOTE     Patient Name: Dottie Jiménez  Date:2020  : 1990  [x]  Patient  Verified  Payor: [place] MEDICARE / Plan: [place] MEDICARE PART A & B / Product Type: Medicare /    In time: 11:00 am         Out time: 11:54 pm  Total Treatment Time (min): 54  Total Timed Codes (min): 44  1:1 Treatment Time (min): 11:20am-11:44am (24)  Visit #: 4 of 8-10    Treatment Area: Chronic pain of left knee [M25.562, G89.29]    SUBJECTIVE  Pain Level (0-10 scale): 5  Any medication changes, allergies to medications, adverse drug reactions, diagnosis change, or new procedure performed?: [x] No    [] Yes (see summary sheet for update)  Subjective functional status/changes:   Patient notes inc'd discomfort from walking the dog this morning. He notes no adverse reaction following last tx.     OBJECTIVE  Modality rationale: decrease inflammation and decrease pain to improve the patients ability to decrease pain with daily activities    Min Type Additional Details    [] Estim: []Att   []Unatt        []TENS instruct                  []IFC  []Premod   []NMES                     []Other:  []w/US   []w/ice   []w/heat  Position:  Location:    []  Traction: [] Cervical       []Lumbar                       [] Prone          []Supine                       []Intermittent   []Continuous Lbs:  [] before manual  [] after manual   8 [x]  Ultrasound: []Continuous   [x] Pulsed - 50%                           []1MHz   [x]3MHz Location: L knee  W/cm2: 1.0    []  Iontophoresis with dexamethasone         Location: [] Take home patch   [] In clinic   10 [x]  Ice     []  heat  []  Ice massage Position: long sitting   Location: L knee     []  Vasopneumatic Device Pressure:       [] lo [] med [] hi   Temperature: [] lo [] med [] hi   [x] Skin assessment post-treatment:  [x]intact []redness- no adverse reaction       []redness - adverse reaction:       36 min Therapeutic Exercise:  [x] See flow sheet: progressed to tandem standin Rationale: increase ROM, increase strength, improve coordination and improve balance to improve the patients ability to progress to increased activity tolerance with decreased pain     X with TE/MT min Patient Education: [x] Review HEP     Other Objective/Functional Measures:        Pain Level (0-10 scale) post treatment: 1    ASSESSMENT/Changes in Function:   Patient cont to be challenged with static balance tasks. Able to achieve full knee extension and mobility. Good response to US at last tx, therefore, cont today with (+) results. Patient will continue to benefit from skilled PT services to modify and progress therapeutic interventions, address functional mobility deficits, address ROM deficits, address strength deficits, analyze and address soft tissue restrictions, analyze and cue movement patterns, analyze and modify body mechanics/ergonomics and assess and modify postural abnormalities to attain remaining goals. [x]  See Plan of Care  []  See progress note/recertification  []  See Discharge Summary         Progress towards goals / Updated goals: · Short Term Goals: To be accomplished in  1  weeks:  1. Pt will be independent and compliant with HEP. -Goal progressing; pt notes compliance (2/20/20)  · Long Term Goals: To be accomplished in  8-12  treatments:  1. Patient will increase FOTO score to 59/100 for indications of increased functional mobility. 2.  Patient will demo 90 deg squat and stand for progression to bending activities around the home   3. Patient will demo 4/5 knee extension strength for ease with stair negotiation   4. Patient will demo 15 sec SLS for ease with SL ADLs with decreased fall risk.  -Goal progressing; MSR GT 30 seconds (2/18/20)     PLAN  [x]  Upgrade activities as tolerated     [x]  Continue plan of care  [x]  Update interventions per flow sheet       []  Discharge due to:_  []  Other:_    Erika Valdez, PTA 2/20/2020

## 2020-02-24 ENCOUNTER — HOSPITAL ENCOUNTER (OUTPATIENT)
Dept: PHYSICAL THERAPY | Age: 30
End: 2020-02-24
Payer: MEDICARE

## 2020-02-26 ENCOUNTER — HOSPITAL ENCOUNTER (OUTPATIENT)
Dept: PHYSICAL THERAPY | Age: 30
Discharge: HOME | End: 2020-02-26
Payer: MEDICARE

## 2020-02-26 PROCEDURE — 97035 APP MDLTY 1+ULTRASOUND EA 15: CPT

## 2020-02-26 PROCEDURE — 97110 THERAPEUTIC EXERCISES: CPT

## 2020-02-26 NOTE — PROGRESS NOTES
PT DAILY TREATMENT NOTE     Patient Name: Gee Munson  Date:2020  : 1990  [x]  Patient  Verified  Payor: Patrick Solomon / Plan: [place] MEDICARE PART A & B / Product Type: Medicare /    In time: 5982       Out time: 1150  Total Treatment Time (min): 51  Total Timed Codes (min): 41  1:1 Treatment Time (min): 38 (6522 - 0946 )  Visit #: 4 of 8-10    Treatment Area: Chronic pain of left knee [M25.562, G89.29]    SUBJECTIVE  Pain Level (0-10 scale): 4-5  Any medication changes, allergies to medications, adverse drug reactions, diagnosis change, or new procedure performed?: [x] No    [] Yes (see summary sheet for update)  Subjective functional status/changes:   Patient reports no change in overall pain levels.      OBJECTIVE  Modality rationale: decrease inflammation and decrease pain to improve the patients ability to decrease pain with daily activities    Min Type Additional Details    [] Estim: []Att   []Unatt        []TENS instruct                  []IFC  []Premod   []NMES                     []Other:  []w/US   []w/ice   []w/heat  Position:  Location:    []  Traction: [] Cervical       []Lumbar                       [] Prone          []Supine                       []Intermittent   []Continuous Lbs:  [] before manual  [] after manual   8 [x]  Ultrasound: []Continuous   [x] Pulsed - 50%                           []1MHz   [x]3MHz Location: L knee in sitting   W/cm2: 1.0    []  Iontophoresis with dexamethasone         Location: [] Take home patch   [] In clinic   10 [x]  Ice     []  heat  []  Ice massage Position: long sitting   Location: L knee     []  Vasopneumatic Device Pressure:       [] lo [] med [] hi   Temperature: [] lo [] med [] hi   [x] Skin assessment post-treatment:  [x]intact []redness- no adverse reaction       []redness - adverse reaction:       33 min Therapeutic Exercise:  [x] See flow sheet: Supervision by PT on bike     Rationale: increase ROM, increase strength, improve coordination and improve balance to improve the patients ability to progress to increased activity tolerance with decreased pain     X with TE/MT min Patient Education: [x] Review HEP     Other Objective/Functional Measures:    LTG 2 - squat test : unable to assess sec to pain   PRE progression- standing HS curls and leg press   % improvement 0%   TKE in standing : full with RTB     Pain Level (0-10 scale) post treatment: 5    ASSESSMENT/Changes in Function:   Patient demo antalgic gait after bike and reports increased pain of unknown cause. Patient demo difficulty holding knee in full extension sec to \"jumping. \" Performed US in sitting to aid in comfortable position. Patient was able to perform all exercise program including PRE progression. Patient will continue to benefit from skilled PT services to modify and progress therapeutic interventions, address functional mobility deficits, address ROM deficits, address strength deficits, analyze and address soft tissue restrictions, analyze and cue movement patterns, analyze and modify body mechanics/ergonomics and assess and modify postural abnormalities to attain remaining goals. [x]  See Plan of Care  []  See progress note/recertification  []  See Discharge Summary         Progress towards goals / Updated goals: · Short Term Goals: To be accomplished in  1  weeks:  1. Pt will be independent and compliant with HEP. -Goal progressing; pt notes compliance (2/20/20)  · Long Term Goals: To be accomplished in  8-12  treatments:  1. Patient will increase FOTO score to 59/100 for indications of increased functional mobility. 2.  Patient will demo 90 deg squat and stand for progression to bending activities around the home  goal not assessed as planned sec to elevated pain levels  2/26/2020  3. Patient will demo 4/5 knee extension strength for ease with stair negotiation   4. Patient will demo 15 sec SLS for ease with SL ADLs with decreased fall risk.  -Goal progressing; MSR GT 30 seconds (2/18/20)     PLAN  [x]  Upgrade activities as tolerated     [x]  Continue plan of care  [x]  Update interventions per flow sheet       []  Discharge due to:_  []  Other:_Patient has 2 more apts scheduled for next week      Brad Dumont, PT 2/26/2020      Future Appointments   Date Time Provider Krysten Catalan   2/26/2020 11:00 AM Sarah Miller., PT [facility]   3/2/2020 12:00 PM Sarah Miller., PT [facility]   3/4/2020 11:30 AM Murleen Cogan, Irby Catchings., PT [facility]

## 2020-03-02 ENCOUNTER — HOSPITAL ENCOUNTER (OUTPATIENT)
Dept: PHYSICAL THERAPY | Age: 30
Discharge: HOME | End: 2020-03-02
Payer: MEDICARE

## 2020-03-02 PROCEDURE — 97140 MANUAL THERAPY 1/> REGIONS: CPT

## 2020-03-02 PROCEDURE — 97014 ELECTRIC STIMULATION THERAPY: CPT

## 2020-03-02 PROCEDURE — 97110 THERAPEUTIC EXERCISES: CPT

## 2020-03-02 NOTE — PROGRESS NOTES
PT DAILY TREATMENT NOTE     Patient Name: Vishnu Diez  Date:3/2/2020  : 1990  [x]  Patient  Verified  Payor: [place] MEDICARE / Plan: [place] MEDICARE PART A & B / Product Type: Medicare /    In time: 5074 Out time: 1251  Total Treatment Time (min): 53  Total Timed Codes (min): 43  1:1 Treatment Time (min): 43  Visit #: 6 of 8-10    Treatment Area: Chronic pain of left knee [M25.562, G89.29]    SUBJECTIVE  Pain Level (0-10 scale): 5  Any medication changes, allergies to medications, adverse drug reactions, diagnosis change, or new procedure performed?: [x] No    [] Yes (see summary sheet for update)  Subjective functional status/changes:   Patient reports \"I've been better. \".      OBJECTIVE  Modality rationale: decrease inflammation and decrease pain to improve the patients ability to decrease pain with daily activities    Min Type Additional Details   10 [x] Estim: IFC   []w/US   [x]w/ice   []w/heat  Position: long sitting   Location: L ant knee     []  Traction: [] Cervical       []Lumbar                       [] Prone          []Supine                       []Intermittent   []Continuous Lbs:  [] before manual  [] after manual   H [x]  Ultrasound: []Continuous   [x] Pulsed - 50%                           []1MHz   [x]3MHz Location: L knee in sitting   W/cm2: 1.0    []  Iontophoresis with dexamethasone         Location: [] Take home patch   [] In clinic    [x]  Ice     []  heat  []  Ice massage Position: long sitting   Location: L knee     []  Vasopneumatic Device Pressure:       [] lo [] med [] hi   Temperature: [] lo [] med [] hi   [x] Skin assessment post-treatment:  [x]intact []redness- no adverse reaction       []redness - adverse reaction:       43 min Therapeutic Exercise:  [x] See flow sheet: Reassess    Rationale: increase ROM, increase strength, improve coordination and improve balance to improve the patients ability to progress to increased activity tolerance with decreased pain     X with TE/MT min Patient Education: [x] Review HEP     Other Objective/Functional Measures:    Goals assessed   Pain at best 3-4/10, at worst 5/10  Subjective % improvement 0%  Objective:   AROM L knee 0-134  L knee strength: flexion 4, extension 4 - inaccurate in the presence of pain   SLS : 9 sec  : painful   Squat : 90 deg but with significant pain  Gait - antalgic with poor wt acceptance on L LE   B pes cavus and poor footwear   Improvements: none noted    Deficits pain with walking, sitting, standing, bending , lifting       Pain Level (0-10 scale) post treatment: 4    ASSESSMENT/Changes in Function:   Patient reassessed for upcoming PN on Wed. Held US sec to no changes in pain levels. Initiated ES for pain relief. Patient reports he feels like he can do most things but with feeling of weakness or discomfort. Patient greatly favors with L LE sec to pain leading to weakness. Patient encouraged to see if he can schedule a FU with referring MD earlier than March 31 for possible additional imaging. Patient also presents with poor foot wear and B pes cavus. Patient reports he is supposed to get new shoes tomorrow, but could also benefit from B arch supports. Patient will continue to benefit from skilled PT services to modify and progress therapeutic interventions, address functional mobility deficits, address ROM deficits, address strength deficits, analyze and address soft tissue restrictions, analyze and cue movement patterns, analyze and modify body mechanics/ergonomics and assess and modify postural abnormalities to attain remaining goals. [x]  See Plan of Care  []  See progress note/recertification  []  See Discharge Summary         Progress towards goals / Updated goals: · Short Term Goals: To be accomplished in  1  weeks:  1. Pt will be independent and compliant with HEP. -Goal met - patient reports daily compliance with HEP (HEP est at IE)  · Long Term Goals:  To be accomplished in  8-12 treatments:  1. Patient will increase FOTO score to 59/100 for indications of increased functional mobility. Goal not met but progressed at 39/100  (30/100 at IE)  2.  Patient will demo 90 deg squat and stand for progression to bending activities around the home  Goal met - patient cant squat to 90 deg but with increased pain (75deg at IE0  3. Patient will demo 4/5 knee extension strength for ease with stair negotiation Goal not met but progressed at 4-/5 (3/5 at IE)   4. Patient will demo 15 sec SLS for ease with SL ADLs with decreased fall risk.  -Goal not met but progressed at 9 sec (5 sec at IE)    PLAN  [x]  Upgrade activities as tolerated     [x]  Continue plan of care  [x]  Update interventions per flow sheet       []  Discharge due to:_  []  Other:_assess response to ES and plan for continuation vs [facility] Jorge, PT 3/2/2020      Future Appointments   Date Time Provider Krysten Catalan   3/2/2020 12:00 PM Leticia Roberts., PT [facility]   3/4/2020 11:30 AM Soham Flores., PT [facility]

## 2020-03-04 ENCOUNTER — HOSPITAL ENCOUNTER (OUTPATIENT)
Dept: PHYSICAL THERAPY | Age: 30
Discharge: HOME | End: 2020-03-04
Payer: MEDICARE

## 2020-03-04 PROCEDURE — 97110 THERAPEUTIC EXERCISES: CPT

## 2020-03-04 PROCEDURE — 97014 ELECTRIC STIMULATION THERAPY: CPT
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
## 2020-03-04 NOTE — PROGRESS NOTES
PT DAILY TREATMENT NOTE     Patient Name: Albaro Baez  Date:3/4/2020  : 1990  [x]  Patient  Verified  Payor: Christine Lincoln / Plan: [place] & B / Product Type: Medicare /    In time: 9489 Out time: 2752  Total Treatment Time (min): 49  Total Timed Codes (min): 39  1:1 Treatment Time (min): 0492 - 6933 (16)  Visit #: 7 of 8-10    Treatment Area: Chronic pain of left knee [M25.562, G89.29]    SUBJECTIVE  Pain Level (0-10 scale): 4.5  Any medication changes, allergies to medications, adverse drug reactions, diagnosis change, or new procedure performed?: [x] No    [] Yes (see summary sheet for update)  Subjective functional status/changes:   Patient reports walking better after getting new shoes.       OBJECTIVE  Modality rationale: decrease inflammation and decrease pain to improve the patients ability to decrease pain with daily activities    Min Type Additional Details   10 [x] Estim: IFC   []w/US   [x]w/ice   []w/heat  Position: long sitting   Location: L ant knee     []  Traction: [] Cervical       []Lumbar                       [] Prone          []Supine                       []Intermittent   []Continuous Lbs:  [] before manual  [] after manual   H [x]  Ultrasound: []Continuous   [x] Pulsed - 50%                           []1MHz   [x]3MHz Location: L knee in sitting   W/cm2: 1.0    []  Iontophoresis with dexamethasone         Location: [] Take home patch   [] In clinic    [x]  Ice     []  heat  []  Ice massage Position: long sitting   Location: L knee     []  Vasopneumatic Device Pressure:       [] lo [] med [] hi   Temperature: [] lo [] med [] hi   [x] Skin assessment post-treatment:  [x]intact []redness- no adverse reaction       []redness - adverse reaction:       39 min Therapeutic Exercise:  [x] See flow sheet:     Rationale: increase ROM, increase strength, improve coordination and improve balance to improve the patients ability to progress to increased activity tolerance with decreased pain     X with TE/MT min Patient Education: [x] Review HEP - discussed DC sec to lack of progress and referred back to MD      Other Objective/Functional Measures:    Objective Assessment complete last session     Pain Level (0-10 scale) post treatment: 4    ASSESSMENT/Changes in Function:   Patient reports slight improvement in pain with new footwear. Patient got MD apt moved up to 3/17 to reassessed other interventions sec to limited change in pain and sx since IE. Patient reports feeling moderate improvement in pain with ES , therefore performed one more treatment. [x]  See Discharge Summary         Progress towards goals / Updated goals: · Short Term Goals: To be accomplished in  1  weeks:  1. Pt will be independent and compliant with HEP. -Goal met - patient reports daily compliance with HEP (HEP est at IE)  · Long Term Goals: To be accomplished in  8-12  treatments:  1. Patient will increase FOTO score to 59/100 for indications of increased functional mobility. Goal not met but progressed at 39/100  (30/100 at IE)  2.  Patient will demo 90 deg squat and stand for progression to bending activities around the home  Goal met - patient cant squat to 90 deg but with increased pain (75deg at IE0  3. Patient will demo 4/5 knee extension strength for ease with stair negotiation Goal not met but progressed at 4-/5 (3/5 at IE)   4. Patient will demo 15 sec SLS for ease with SL ADLs with decreased fall risk. -Goal not met but progressed at 9 sec (5 sec at IE)    PLAN  [x]  Discharge due to:_program complete and no appreciable progress to goals.       Rach Espino, PT 3/4/2020      Future Appointments   Date Time Provider Krysten Catalan   3/4/2020 11:30 AM Ana M January, Shama Enciso., PT [facility]

## 2020-05-28 ENCOUNTER — OFFICE VISIT (OUTPATIENT)
Dept: ORTHOPEDIC SURGERY | Age: 30
End: 2020-05-28

## 2020-05-28 VITALS
BODY MASS INDEX: 30.05 KG/M2 | DIASTOLIC BLOOD PRESSURE: 73 MMHG | RESPIRATION RATE: 15 BRPM | SYSTOLIC BLOOD PRESSURE: 108 MMHG | WEIGHT: 187 LBS | HEART RATE: 73 BPM | TEMPERATURE: 96.6 F | HEIGHT: 66 IN

## 2020-05-28 DIAGNOSIS — M22.2X2 PATELLOFEMORAL DISORDER, LEFT: Primary | ICD-10-CM

## 2020-05-28 RX ORDER — NAPROXEN 500 MG/1
500 TABLET ORAL 2 TIMES DAILY WITH MEALS
COMMUNITY

## 2020-05-28 RX ORDER — MELOXICAM 15 MG/1
TABLET ORAL
Qty: 90 TAB | Refills: 0 | Status: SHIPPED | OUTPATIENT
Start: 2020-05-28

## 2020-05-28 RX ORDER — MIRTAZAPINE 45 MG/1
45 TABLET, FILM COATED ORAL
COMMUNITY

## 2020-05-28 NOTE — PROGRESS NOTES
HISTORY OF PRESENT ILLNESS    Emi Deleon 1990 is a 26y.o. year old male comes in today as new patient for: left knee pain    Patients symptoms have been present for about 10 years. Pain level 8/10 anterior. It has worsened with walking, stairs. Patient has tried:  PT without benefit. It is described as pain in knee after below truck on hill the door hit him hard in back of leg. Naproxen minimal help but Rx for back. Ice helps some. IMAGING: XR left knee 2/5/20  No significant abnormality. History reviewed. No pertinent surgical history. Social History     Socioeconomic History    Marital status: UNKNOWN     Spouse name: Not on file    Number of children: Not on file    Years of education: Not on file    Highest education level: Not on file   Tobacco Use    Smoking status: Never Smoker    Smokeless tobacco: Never Used   Substance and Sexual Activity    Alcohol use: Not Currently    Drug use: Not Currently      Current Outpatient Medications   Medication Sig Dispense Refill    naproxen (NAPROSYN) 500 mg tablet Take 500 mg by mouth two (2) times daily (with meals).  mirtazapine (Remeron) 45 mg tablet Take 45 mg by mouth nightly. Past Medical History:   Diagnosis Date    Autism     Depression      History reviewed. No pertinent family history. ROS:  No swell, bruise, some numb left knee to leg. All other systems reviewed and negative aside from that written in the HPI. Objective:  /73   Pulse 73   Temp (!) 96.6 °F (35.9 °C)   Resp 15   Ht 5' 6\" (1.676 m)   Wt 187 lb (84.8 kg)   BMI 30.18 kg/m²   GEN: Appears stated age in NAD. HEAD:  Normocephalic, atraumatic. NEURO:  Sensation intact light touch B/L lower extremities. MS:  LE Strength +5/5 bilateral .   left Knee:  1+ Effusion . Lachman negative. Valgus negative. Varus negative. negative joint line tenderness medial.  Dharmesh negative. Posterior drawer negative.   Esquivel compression test negative. Patellar apprehension negative. Patellar facet  positive tender to palpation medial no crepitance left. Pes anserine negative TTP bilateral   EXT: no clubbing/cyanosis. no edema. SKIN: Warm/dry without rash  HEENT: Conjunctiva/lids WNL. External canals/nares WNL. Tongue midline. PERRL, EOMI. Hearing intact. NECK: Trachea midline. Supple, Full ROM. No thyromegaly. CARDIAC: No edema. LUNGS: Normal effort. ABD: Soft, no masses. No HSM. PSYCH: A+O x3. Appropriate judgment and insight. Assessment/Plan:     ICD-10-CM ICD-9-CM    1. Patellofemoral disorder, left M22.2X2 719.96 meloxicam (MOBIC) 15 mg tablet       Patient (or guardian if minor) verbalizes understanding of evaluation and plan. Will start HEP for PFS and ice/mobic PRN. Return 6 weeks consider patellar stabilizing brace and injection if desired.

## 2020-06-22 ENCOUNTER — OFFICE VISIT (OUTPATIENT)
Dept: ORTHOPEDIC SURGERY | Age: 30
End: 2020-06-22

## 2020-06-22 VITALS
BODY MASS INDEX: 27.16 KG/M2 | DIASTOLIC BLOOD PRESSURE: 74 MMHG | WEIGHT: 169 LBS | HEART RATE: 74 BPM | RESPIRATION RATE: 15 BRPM | HEIGHT: 66 IN | SYSTOLIC BLOOD PRESSURE: 145 MMHG | TEMPERATURE: 96.9 F

## 2020-06-22 DIAGNOSIS — M22.2X2 PATELLOFEMORAL DISORDER, LEFT: Primary | ICD-10-CM

## 2020-06-22 DIAGNOSIS — M70.50 PES ANSERINE BURSITIS: ICD-10-CM

## 2020-06-22 RX ORDER — DICLOFENAC SODIUM 10 MG/G
4 GEL TOPICAL
Qty: 300 G | Refills: 1 | Status: SHIPPED | OUTPATIENT
Start: 2020-06-22 | End: 2020-10-12 | Stop reason: SDUPTHER

## 2020-06-22 NOTE — PROGRESS NOTES
HISTORY OF PRESENT ILLNESS    Leander Roman 1990 is a 26y.o. year old male comes in today to be evaluated and treated for: left knee pain    Since last appt has noticed minimal improvement HEP daily. Pain level 9/10. Using mobic with some benefit. Was D/C PT [id number] as not improving. Has added pain medial lower knee at pes anserine. IMAGING: XR left knee 2/5/20  No significant abnormality. History reviewed. No pertinent surgical history. Social History     Socioeconomic History    Marital status: UNKNOWN     Spouse name: Not on file    Number of children: Not on file    Years of education: Not on file    Highest education level: Not on file   Tobacco Use    Smoking status: Never Smoker    Smokeless tobacco: Never Used   Substance and Sexual Activity    Alcohol use: Not Currently    Drug use: Not Currently     Current Outpatient Medications   Medication Sig Dispense Refill    naproxen (NAPROSYN) 500 mg tablet Take 500 mg by mouth two (2) times daily (with meals).  mirtazapine (Remeron) 45 mg tablet Take 45 mg by mouth nightly.  meloxicam (MOBIC) 15 mg tablet Take 1 tab daily as needed pain with food. 80 Tab 0     Past Medical History:   Diagnosis Date    Autism     Depression      History reviewed. No pertinent family history. ROS:  No swell, bruise    Objective:  /74   Pulse 74   Temp 96.9 °F (36.1 °C)   Resp 15   Ht 5' 6\" (1.676 m)   Wt 169 lb (76.7 kg)   BMI 27.28 kg/m²   GEN: Appears stated age in NAD. HEAD:  Normocephalic, atraumatic. NEURO:  Sensation intact light touch B/L lower extremities. MS:  LE Strength +5/5 bilateral .   left Knee:  1+ Effusion . Lachman negative. Valgus negative. Varus negative. negative joint line tenderness medial.  Dharmesh negative. Posterior drawer negative. Noble compression test negative. Patellar apprehension negative. Patellar facet  positive tender to palpation medial no crepitance left.   Pes anserine significant TTP left  EXT: no clubbing/cyanosis. no edema. SKIN: Warm/dry without rash    Assessment/Plan:     ICD-10-CM ICD-9-CM    1. Patellofemoral disorder, left M22.2X2 719.96 REFERRAL TO PHYSICAL THERAPY   2. Pes anserine bursitis M70.50 726.61 REFERRAL TO PHYSICAL THERAPY      diclofenac (VOLTAREN) 1 % gel       Patient (or guardian if minor) verbalizes understanding of evaluation and plan. Will refer PT as would get great benefit ionto and will Rx voltaren gel and demo stretch for pes anserine in addition to HEP for PFS and RTC 6 weeks. Consider patellar stabilizing brace if not improved.

## 2020-07-20 ENCOUNTER — HOSPITAL ENCOUNTER (OUTPATIENT)
Dept: PHYSICAL THERAPY | Age: 30
Discharge: HOME | End: 2020-07-20
Payer: MEDICARE

## 2020-07-20 PROCEDURE — 97162 PT EVAL MOD COMPLEX 30 MIN: CPT

## 2020-07-20 NOTE — PROGRESS NOTES
[facility] PHYSICAL THERAPY AT [address]  Phone: [phone] Fax: [phone] / [address] [address] PHYSICAL THERAPY SERVICES  Patient Name: Caroline George : 1990   Medical   Diagnosis: Pain in left knee [M25.562] Treatment Diagnosis: L knee pes anserine/ PFPS   Onset Date:      Referral Source: Kamaljit Maldonado DO [facility]): 2020   Prior Hospitalization: See medical history Provider #: [id number]   Prior Level of Function: Functional I , no AD    Comorbidities: None noted    Medications: Verified on Patient Summary List   The Plan of Care and following information is based on the information from the initial evaluation.   ========================================================================  Assessment / key information:  Pt is a 26y.o. year old male who presents with co L knee pain and weakness starting back in  from fall. Patient had PT for same condition for 1 month in Feb but was DC sec to lack of progress. Patient reports no significant changes since last treatment. No new treatment. Patient reports attempts at exercise given by DO increased knee pain. New medication providing no pain relief, but gel helps short term. Patient is pending MRI tomorrow. Patient had one fall yesterday sec to knee buckling. .  Current deficits include: increased pain to 9/10 at worst and 1/10 at best,  Posture:          WNL   Gait:                Antalgic, using wall to support      ROM :  Hip WNL  Knee 0-133 with pain with flexion   Ankle: DF 5 deg      Strength :  Hip: flexion 3, ABD 4 extension 4  Knee: flexion 4+, extension 3 pain on resistance , quad lag +   Core: bridge 90%, plank NT      Flexibility:   Hamstrings:  90/90 HS test +  Quadriceps: Fauzia Test +  Gastroc:   +     Palpation: tender to light touch B joint line and patellar ligament        Girth Measurements:   Quad tone : L 40.5, R 42.5      Cm at joint line   Left 38   Right  38             Other tests/comments:  30 sec sit to stand : 13 with increased pain, no UE   Functional deficits include: walking - using the wall/ furniture to support externally, walking tolerance: limping at 12 min, stair negotiation - 5 DUNG , bending and lifting, carrying heavy items. Home exercise program initiated on initial evaluation to address these deficits.  Pt would benefit from PT to address these deficits for increased functional mobility and QOL.  ========================================================================  Eval Complexity: History: MEDIUM  Complexity : 1-2 comorbidities / personal factors will impact the outcome/ POC Exam:HIGH Complexity : 4+ Standardized tests and measures addressing body structure, function, activity limitation and / or participation in recreation  Presentation: MEDIUM Complexity : Evolving with changing characteristics  Clinical Decision Making:MEDIUM Complexity : FOTO score of 26-74Overall Complexity:MEDIUM  Problem List: pain affecting function, decrease ROM, decrease strength, impaired gait/ balance, decrease ADL/ functional abilitiies, decrease activity tolerance, decrease flexibility/ joint mobility, decrease transfer abilities and other FOTO 49/100   Treatment Plan may include any combination of the following: Therapeutic exercise, Therapeutic activities, Neuromuscular re-education, Physical agent/modality, Gait/balance training, Manual therapy, Aquatic therapy, Patient education, Self Care training, Functional mobility training, Home safety training, Stair training and Other: IONTO WITH DEXAMETHASONE  Patient / Family readiness to learn indicated by: asking questions, trying to perform skills and interest  Persons(s) to be included in education: patient (P)  Barriers to Learning/Limitations: pain, motivation   Measures taken: patient education    Patient Goal (s): \"pain relief \"   Patient self reported health status: fair  Rehabilitation Potential: fair   Short Term Goals: To be accomplished in  1  weeks:  1. Pt will be independent and compliant with HEP   Long Term Goals: To be accomplished in  8-10  treatments:  1. Patient will increase FOTO score to 68/100 for indications of increased functional mobility. 2.  Patient will demo 4/5 knee extension strength for progression of quad stability with SL activities   3. Patient will demo negative SLR quad lag for improved quad strength to decrease fall risk   4. Patient will report increased walking tolerance to 17 min before onset of limping to increased community mobility   Frequency / Duration:   Patient to be seen  2-3  times per week for 8-10  treatments:  Patient / Caregiver education and instruction: self care, activity modification and exercises  Therapist Signature: Jackson Aldana PT Date: 0/[birth year]1910   Certification Period: 7/20/2020-10/18/2020 Time: 4346Q   ========================================================================  I certify that the above Physical Therapy Services are being furnished while the patient is under my care. I agree with the treatment plan and certify that this therapy is necessary. Physician Signature:        Date:       Time:   Please sign and return to [facility] or you may fax the signed copy to [phone]. Thank you.

## 2020-07-20 NOTE — PROGRESS NOTES
PT KNEE / HIP EVAL AND TREATMENT    Patient Name: Richard Ruby  Date:2020  : 1990  [x]  Patient  Verified  Payor: [place] MEDICARE / Plan: [place] MEDICARE PART A & B / Product Type: Medicare /    In time:1015  Out time:1100  Total Treatment Time (min): 45  Total Timed Codes (min): 10  1:1 Treatment Time ([facility] only): 39   Visit #: 1 of 8-10    Treatment Area: Pain in left knee [M25.562]    SUBJECTIVE  Pain Level (0-10 scale): (C):4  (B):1  (W):  9  Any medication changes, allergies to medications, diagnosis change, or new procedure performed: see summary sheet for update  Subjective functional status/changes:  CHIEF COMPLAINT: Patient co L knee pain and weakness starting back in  from fall. Patient had PT for same condition for 1 month in Feb but was DC sec to lack of progress. Patient reports no significant changes since last treatment. No new treatment. Patient reports attempts at exercise given by DO increased knee pain. New medication providing no pain relief, but gel helps short term. Patient is pending MRI tomorrow. Patient had one fall yesterday sec to knee buckling. DEFICITS: walking - using the wall/ furniture to support externally, walking tolerance: limping at 12 min, stair negotiation - 5 DUNG , bending and lifting, carrying heavy items     OBJECTIVE  Posture:  WNL   Gait:  Antalgic, using wall to support     ROM :  Hip WNL  Knee 0-133 with pain with flexion   Ankle: DF 5 deg     Strength :  Hip: flexion 3, ABD 4 extension 4  Knee: flexion 4+, extension 3 pain on resistance , quad lag +   Core: bridge 90%, plank NT     Flexibility:   Hamstrings:  90/90 HS test +  Quadriceps: Fauzia Test +  Gastroc:   +    Palpation: tender to light touch B joint line and patellar ligament       Girth Measurements:   Quad tone : L 40.5, R 42.5     Cm at joint line   Left 38   Right  38             Other tests/comments:  30 sec sit to stand : 13 with increased pain, no UE    Patient Education/ Therapeutic Exercise : [x] Discussed POT including PT expectation, established HEP with pictures and instruction , ionto consent      Manual: NI     Modality (rationale): decrease inflammation to increase activity tolerance   [x] IONTO with dex: 10 min with instruction, application and observation        Pain Level (0-10 scale) post treatment: 4  ASSESSMENT  [x]  See Plan of Care    PLAN  [x]  Upgrade activities as tolerated     [x] Other:_ POC  2-3 x 8-10    Rusty Rucker, PT 7/20/2020     Justification for Eval Code Complexity:  Patient History : previous treatment for same condition , sedentary   Examination see exam   Clinical Presentation: evolving sec to chronicity   Clinical Decision Making : FOTO : 49 /100

## 2020-07-24 ENCOUNTER — HOSPITAL ENCOUNTER (OUTPATIENT)
Dept: PHYSICAL THERAPY | Age: 30
Discharge: HOME | End: 2020-07-24
Payer: MEDICARE

## 2020-07-24 PROCEDURE — 97112 NEUROMUSCULAR REEDUCATION: CPT

## 2020-07-24 PROCEDURE — 97110 THERAPEUTIC EXERCISES: CPT

## 2020-07-24 PROCEDURE — 97116 GAIT TRAINING THERAPY: CPT

## 2020-07-24 NOTE — PROGRESS NOTES
PT DAILY TREATMENT NOTE     Patient Name: Malena Ojeda  Date:2020  : 1990  [x]  Patient  Verified  Payor: VA MEDICARE / Plan: VA MEDICARE PART A & B / Product Type: Medicare /    In time: 11:00 am        Out time: 11:49 pm  Total Treatment Time (min): 49  Total Timed Codes (min): 49  1:1 Treatment Time (min): 49   Visit #: 2 of 8-10    Treatment Area: Pain in left knee [M25.562]    SUBJECTIVE  Pain Level (0-10 scale): 3  Any medication changes, allergies to medications, adverse drug reactions, diagnosis change, or new procedure performed?: [x] No    [] Yes (see summary sheet for update)  Subjective functional status/changes:   [] No changes reported  Patient reiterated subjective complaints.     OBJECTIVE  Modality rationale: decrease inflammation and decrease pain to improve the patients ability to perform ADLs     Min Type Additional Details    [] Estim: []Att   []Unatt                  []IFC  []Premod                                            []NMES    []Other:  []w/ice   []w/heat  Position:  Location:    []  Traction: [] Cervical       [] Lumbar                       [] Supine        [] Prone                       []Intermittent   []Continuous Lbs:  [] before manual  [] after manual    []  Ultrasound: []Continuous   [] Pulsed                           []1MHz   []3MHz Location:  W/cm2:   8 with setup and wait [x]  Iontophoresis with dexamethasone         Location: (L) anteromedial knee [x] Take home patch   [] In clinic    []  Ice     []  heat  []  Ice massage Position:  Location:    []  Vasopneumatic Device Pressure: [] lo [] med [] hi   Temp: [] lo [] med [] hi   [x] Skin assessment post-treatment:  [x]intact    []redness- no adverse reaction                                                                                 []redness - adverse reaction:     15 min Therapeutic Exercise:  [x] See flow sheet: initiated therex per IE   Rationale: increase ROM, increase strength and improve coordination to improve the patients ability to perform ADLs, increase walking tolerance      17 min Neuromuscular Re-education:  [x]  See flow sheet: initiated per IE   Rationale: improve coordination, improve balance and increase proprioception  to improve the patients ability to negotiate hurdles, transfer    9 min Gait Trainin feet x 2 req distant S and intermittent SBA (sidestepping, retro walking, tandem walking, toe walking, and heel walking)   Rationale: increase proprioception, improve balance strategies, increase strength to improve the patient's ability to ambulate with reduced fall risk          with TE min Patient Education: [x] Review HEP     Other Objective/Functional Measures:   STG met. Patient with full knee extension at EOB. Unable to perform NMES due to patient wearing jeans, discussed use of shorts NV to allow for usage. Pain Level (0-10 scale) post treatment: 2-3    ASSESSMENT/Changes in Function:   Good tolerance to treatment today with patient req 10% verbal/tactile cueing and demo for proper form/technique with all newly introduced therex. Patient will continue to benefit from skilled PT services to modify and progress therapeutic interventions, address functional mobility deficits, address ROM deficits, address strength deficits, analyze and address soft tissue restrictions, analyze and cue movement patterns, analyze and modify body mechanics/ergonomics and assess and modify postural abnormalities to attain remaining goals. [x]  See Plan of Care  []  See progress note/recertification  []  See Discharge Summary         Progress towards goals / Updated goals: · Short Term Goals: To be accomplished in  1  weeks:  1. Pt will be independent and compliant with HEP. -Goal met; pt notes compliance (20)  · Long Term Goals: To be accomplished in  8-10  treatments:  1. Patient will increase FOTO score to 68/100 for indications of increased functional mobility.     2. Patient will demo 4/5 knee extension strength for progression of quad stability with SL activities   3. Patient will demo negative SLR quad lag for improved quad strength to decrease fall risk   4.  Patient will report increased walking tolerance to 17 min before onset of limping to increased community mobility     PLAN  [x]  Upgrade activities as tolerated     [x]  Continue plan of care  []  Update interventions per flow sheet       []  Discharge due to:_  [x]  Other: assess response to treatment     Nehemias Schulz, NEVIN 7/24/2020

## 2020-07-27 ENCOUNTER — APPOINTMENT (OUTPATIENT)
Dept: PHYSICAL THERAPY | Age: 30
End: 2020-07-27
Payer: MEDICARE

## 2020-07-31 ENCOUNTER — HOSPITAL ENCOUNTER (OUTPATIENT)
Dept: PHYSICAL THERAPY | Age: 30
Discharge: HOME | End: 2020-07-31
Payer: MEDICARE

## 2020-07-31 PROCEDURE — 97032 APPL MODALITY 1+ESTIM EA 15: CPT

## 2020-07-31 PROCEDURE — 97112 NEUROMUSCULAR REEDUCATION: CPT

## 2020-07-31 PROCEDURE — 97116 GAIT TRAINING THERAPY: CPT

## 2020-07-31 PROCEDURE — 97110 THERAPEUTIC EXERCISES: CPT

## 2020-07-31 NOTE — PROGRESS NOTES
PT DAILY TREATMENT NOTE     Patient Name: Ritchie Shah  Date:2020  : 1990  [x]  Patient  Verified  Payor: [place] [employer] / Plan: [place] MED[place] A & B / Product Type: Medicare /    In time: 2058        Out time: 1:57 pm  Total Treatment Time (min): 62  Total Timed Codes (min): 52  1:1 Treatment Time (min): 52  Visit #: 3 of 8-10    Treatment Area: Pain in left knee [M25.562]    SUBJECTIVE  Pain Level (0-10 scale): 3  Any medication changes, allergies to medications, adverse drug reactions, diagnosis change, or new procedure performed?: [x] No    [] Yes (see summary sheet for update)  Subjective functional status/changes:   [] No changes reported  Patient reports he was late due to car breaking down    OBJECTIVE  Modality rationale: decrease inflammation and decrease pain to improve the patients ability to perform ADLs     Min Type Additional Details   8 [] Estim: []Att   []Unatt                  []IFC  []Premod                                            [x]NMES 10s/10s   []Other:  []w/ice   []w/heat  Position: long sit reclined w bolster under knees  Location: L knee    []  Traction: [] Cervical       [] Lumbar                       [] Supine        [] Prone                       []Intermittent   []Continuous Lbs:  [] before manual  [] after manual    []  Ultrasound: []Continuous   [] Pulsed                           []1MHz   []3MHz Location:  W/cm2:   0 [x]  Iontophoresis with dexamethasone         Location: (L) anteromedial knee [x] Take home patch   [] In clinic   10 [x]  Ice     []  heat  []  Ice massage Position:reclined long sit Location: L knee    []  Vasopneumatic Device Pressure: [] lo [] med [] hi   Temp: [] lo [] med [] hi   [x] Skin assessment post-treatment:  [x]intact    []redness- no adverse reaction                                                                                 []redness - adverse reaction:     18 min Therapeutic Exercise:  [x] See flow sheet: added SLR Rationale: increase ROM, increase strength and improve coordination to improve the patients ability to perform ADLs, increase walking tolerance      17 min Neuromuscular Re-education:  [x]  See flow sheet: initiated per IE   Rationale: improve coordination, improve balance and increase proprioception  to improve the patients ability to negotiate hurdles, transfer    9 min Gait Trainin feet x 1 req distant S and intermittent SBA (sidestepping,  toe walking, and heel walking)   Rationale: increase proprioception, improve balance strategies, increase strength to improve the patient's ability to ambulate with reduced fall risk          with TE min Patient Education: [x] Review HEP     Other Objective/Functional Measures: Added SLR, hamstring, hip flexor stretching, added band to side stepping  Added standing tke with band to HEP  Pain Level (0-10 scale) post treatment: 1    ASSESSMENT/Changes in Function:   Good tolerance to treatment today with vc and demo for all new exercises. Pt was able to tolerate progressions with no significant inc in sx. Pt initiated NMES for VMO today with 10s on 10 s off to improve muscle fiber contraction and recruitment. NMES was performed with SAQ. Pt reported no inc in sx with NMES and tolerated tx well reporting dec in sx following tx. Pt utilized cold pack on L knee post tx to dec inflammation and pain. Pt repored dec sx following tx. Patient will continue to benefit from skilled PT services to modify and progress therapeutic interventions, address functional mobility deficits, address ROM deficits, address strength deficits, analyze and address soft tissue restrictions, analyze and cue movement patterns, analyze and modify body mechanics/ergonomics and assess and modify postural abnormalities to attain remaining goals. [x]  See Plan of Care  []  See progress note/recertification  []  See Discharge Summary         Progress towards goals / Updated goals:   · Short Term Goals: To be accomplished in  1  weeks:  1. Pt will be independent and compliant with HEP. -Goal met; pt notes compliance (7/24/20)  · Long Term Goals: To be accomplished in  8-10  treatments:  1. Patient will increase FOTO score to 68/100 for indications of increased functional mobility. 2.  Patient will demo 4/5 knee extension strength for progression of quad stability with SL activities   3. Patient will demo negative SLR quad lag for improved quad strength to decrease fall risk   4.  Patient will report increased walking tolerance to 17 min before onset of limping to increased community mobility     PLAN  [x]  Upgrade activities as tolerated     [x]  Continue plan of care  []  Update interventions per flow sheet       []  Discharge due to:_  [x]  Other: assess response to treatment     Rosalio Means 7/31/2020

## 2020-08-03 ENCOUNTER — HOSPITAL ENCOUNTER (OUTPATIENT)
Dept: PHYSICAL THERAPY | Age: 30
Discharge: HOME | End: 2020-08-03
Payer: MEDICARE

## 2020-08-03 PROCEDURE — 97110 THERAPEUTIC EXERCISES: CPT

## 2020-08-03 PROCEDURE — 97112 NEUROMUSCULAR REEDUCATION: CPT

## 2020-08-03 PROCEDURE — 97014 ELECTRIC STIMULATION THERAPY: CPT

## 2020-08-03 PROCEDURE — 97116 GAIT TRAINING THERAPY: CPT

## 2020-08-03 NOTE — PROGRESS NOTES
PT DAILY TREATMENT NOTE     Patient Name: Tyra Arias  Date:8/3/2020  : 1990  [x]  Patient  Verified  Payor: [place] MED[employer]ARE / Plan: [place] MEDICARE PART A & B / Product Type: Medicare /    In time: 10:44 am        Out time: 1138 am  Total Treatment Time (min): 54  Total Timed Codes (min): 44  1:1 Treatment Time (min): 44  Visit #: 4 of 8-10    Treatment Area: Pain in left knee [M25.562]    SUBJECTIVE  Pain Level (0-10 scale): 4  Any medication changes, allergies to medications, adverse drug reactions, diagnosis change, or new procedure performed?: [x] No    [] Yes (see summary sheet for update)  Subjective functional status/changes:   [] No changes reported  Patient reports his knee was sore for a couple hours after therapy then it went back to normal    OBJECTIVE  Modality rationale: decrease inflammation and decrease pain to improve the patients ability to perform ADLs     Min Type Additional Details   10 [] Estim: []Att   [x]Unatt                  []IFC  []Premod                                            [x]NMES 10s/10s   []Other:  [x]w/ice   []w/heat  Position: long sit reclined w bolster under knees  Location: L knee    []  Traction: [] Cervical       [] Lumbar                       [] Supine        [] Prone                       []Intermittent   []Continuous Lbs:  [] before manual  [] after manual    []  Ultrasound: []Continuous   [] Pulsed                           []1MHz   []3MHz Location:  W/cm2:   0 [x]  Iontophoresis with dexamethasone         Location: (L) anteromedial knee [x] Take home patch   [] In clinic    []  Ice     []  heat  []  Ice massage Position:reclined long sit Location: L knee    []  Vasopneumatic Device Pressure: [] lo [] med [] hi   Temp: [] lo [] med [] hi   [x] Skin assessment post-treatment:  [x]intact    []redness- no adverse reaction                                                                                 []redness - adverse reaction:     26 min Therapeutic Exercise:  [x] See flow sheet: added SLR   Rationale: increase ROM, increase strength and improve coordination to improve the patients ability to perform ADLs, increase walking tolerance      8 min Neuromuscular Re-education:  [x]  See flow sheet: initiated per IE   Rationale: improve coordination, improve balance and increase proprioception  to improve the patients ability to negotiate hurdles, transfer    10 min Gait Trainin feet x 1 req distant S and intermittent SBA (sidestepping, tandem walking)   Rationale: increase proprioception, improve balance strategies, increase strength to improve the patient's ability to ambulate with reduced fall risk          with TE min Patient Education: [x] Review HEP     Other Objective/Functional Measures: Added 3 was SLR, clam, and incline stretch    Pain Level (0-10 scale) post treatment: 0 \" frozen\"    ASSESSMENT/Changes in Function:   Good tolerance to todays session. Pt required demo and vc with all new exercises to perform correctly. Leg press was held today due to pt reporting inc in knee pain with mini squat. Mini squat was modified to sit to stand from raised tx table which was tolerable to pt. Pt also reported some irritation in knee with sidelying hip abd which resolve with cue to move LE into extension vs flexion. Pt was given updated HEP including 3 way SLR, bridges, clams to progress strength at home. Patient will continue to benefit from skilled PT services to modify and progress therapeutic interventions, address functional mobility deficits, address ROM deficits, address strength deficits, analyze and address soft tissue restrictions, analyze and cue movement patterns, analyze and modify body mechanics/ergonomics and assess and modify postural abnormalities to attain remaining goals. [x]  See Plan of Care  []  See progress note/recertification  []  See Discharge Summary         Progress towards goals / Updated goals: · Short Term Goals:  To be accomplished in  1  weeks:  1. Pt will be independent and compliant with HEP. -Goal met; pt notes compliance (7/24/20)  · Long Term Goals: To be accomplished in  8-10  treatments:  1. Patient will increase FOTO score to 68/100 for indications of increased functional mobility. 2.  Patient will demo 4/5 knee extension strength for progression of quad stability with SL activities   3. Patient will demo negative SLR quad lag for improved quad strength to decrease fall risk   4.  Patient will report increased walking tolerance to 17 min before onset of limping to increased community mobility     PLAN  [x]  Upgrade activities as tolerated     [x]  Continue plan of care  []  Update interventions per flow sheet       []  Discharge due to:_  [x]  Other: assess response to treatment     Sherrie Florian 8/3/2020

## 2020-08-05 ENCOUNTER — HOSPITAL ENCOUNTER (OUTPATIENT)
Dept: PHYSICAL THERAPY | Age: 30
Discharge: HOME | End: 2020-08-05
Payer: MEDICARE

## 2020-08-05 PROCEDURE — 97014 ELECTRIC STIMULATION THERAPY: CPT

## 2020-08-05 PROCEDURE — 97110 THERAPEUTIC EXERCISES: CPT

## 2020-08-05 PROCEDURE — 97530 THERAPEUTIC ACTIVITIES: CPT

## 2020-08-05 PROCEDURE — 97112 NEUROMUSCULAR REEDUCATION: CPT

## 2020-08-05 NOTE — PROGRESS NOTES
PT DAILY TREATMENT NOTE     Patient Name: Rajni Vignesh  Date:2020  : 1990  [x]  Patient  Verified  Payor: [place] MEDICARE / Plan: [place] MEDICARE PART A & B / Product Type: Medicare /    In time: 11:30 am        Out time: 12:22 pm  Total Treatment Time (min): 52  Total Timed Codes (min): 42  1:1 Treatment Time (min): 42  Visit #: 5 of 8-10    Treatment Area: Pain in left knee [M25.562]    SUBJECTIVE  Pain Level (0-10 scale): 1  Any medication changes, allergies to medications, adverse drug reactions, diagnosis change, or new procedure performed?: [x] No    [] Yes (see summary sheet for update)  Subjective functional status/changes:   [] No changes reported  Patient reports he is doing well today, the knee is hurting less, he has been working on his  HEP, but has not found a place to tie his band for the TKE yet.    OBJECTIVE  Modality rationale: decrease inflammation and decrease pain to improve the patients ability to perform ADLs     Min Type Additional Details   10 [] Estim: []Att   [x]Unatt                  []IFC  []Premod                                            [x]NMES 10s/10s 2 s ramp  []Other:  [x]w/ice   []w/heat  Position: long sit reclined w bolster under knees  Location: L knee    []  Traction: [] Cervical       [] Lumbar                       [] Supine        [] Prone                       []Intermittent   []Continuous Lbs:  [] before manual  [] after manual    []  Ultrasound: []Continuous   [] Pulsed                           []1MHz   []3MHz Location:  W/cm2:   0 [x]  Iontophoresis with dexamethasone         Location: (L) anteromedial knee [x] Take home patch   [] In clinic    []  Ice     []  heat  []  Ice massage Position:reclined long sit Location: L knee    []  Vasopneumatic Device Pressure: [] lo [] med [] hi   Temp: [] lo [] med [] hi   [x] Skin assessment post-treatment:  [x]intact    []redness- no adverse reaction []redness - adverse reaction:     19 min Therapeutic Exercise:  [x] See flow sheet: added SLR   Rationale: increase ROM, increase strength and improve coordination to improve the patients ability to perform ADLs, increase walking tolerance      8 min Neuromuscular Re-education:  [x]  See flow sheet: initiated per IE   Rationale: improve coordination, improve balance and increase proprioception  to improve the patients ability to negotiate hurdles, transfer     min Gait Trainin feet x 1 req distant S and intermittent SBA (sidestepping, tandem walking)   Rationale: increase proprioception, improve balance strategies, increase strength to improve the patient's ability to ambulate with reduced fall risk    15 min Therapeutic Activity:  []  See flow sheet :   Rationale: increase strength and improve coordination  to improve the patients ability to transfer in and out of chairs, navigate stairs, and walk        with TE min Patient Education: [x] Review HEP     Other Objective/Functional Measures: Added step ups and heel raises  Pain Level (0-10 scale) post treatment: 1    ASSESSMENT/Changes in Function:   Pt was able to tolerate new exercises of step ups and heel raises to improve quadricep and gastroc strength for improved ability to navigate stairs. Pt reported no significant inc in sx with new exercises, only \"tension\" in the knee. PT plans to continue to add exercises and return to leg press as tolerated. Pt responds well to NMES demonstrating improved activation of VMO with stim. Patient will continue to benefit from skilled PT services to modify and progress therapeutic interventions, address functional mobility deficits, address ROM deficits, address strength deficits, analyze and address soft tissue restrictions, analyze and cue movement patterns, analyze and modify body mechanics/ergonomics and assess and modify postural abnormalities to attain remaining goals.      [x]  See Plan of Care  []  See progress note/recertification  []  See Discharge Summary         Progress towards goals / Updated goals: · Short Term Goals: To be accomplished in  1  weeks:  1. Pt will be independent and compliant with HEP. -Goal met; pt notes compliance (7/24/20)  · Long Term Goals: To be accomplished in  8-10  treatments:  1. Patient will increase FOTO score to 68/100 for indications of increased functional mobility. 2.  Patient will demo 4/5 knee extension strength for progression of quad stability with SL activities   3. Patient will demo negative SLR quad lag for improved quad strength to decrease fall risk Progressing with NMES to improve quad recruitment 8/5/20  4.  Patient will report increased walking tolerance to 17 min before onset of limping to increased community mobility     PLAN  [x]  Upgrade activities as tolerated     [x]  Continue plan of care  []  Update interventions per flow sheet       []  Discharge due to:_  []  Other:    Yusra Cobos 8/5/2020

## 2020-08-10 ENCOUNTER — HOSPITAL ENCOUNTER (OUTPATIENT)
Dept: PHYSICAL THERAPY | Age: 30
Discharge: HOME | End: 2020-08-10
Payer: MEDICARE

## 2020-08-10 PROCEDURE — 97110 THERAPEUTIC EXERCISES: CPT

## 2020-08-10 PROCEDURE — 97530 THERAPEUTIC ACTIVITIES: CPT

## 2020-08-10 PROCEDURE — 97014 ELECTRIC STIMULATION THERAPY: CPT

## 2020-08-10 PROCEDURE — 97112 NEUROMUSCULAR REEDUCATION: CPT

## 2020-08-10 NOTE — PROGRESS NOTES
PT DAILY TREATMENT NOTE     Patient Name: Fawn   Date:8/10/2020  : 1990  [x]  Patient  Verified  Payor: [employer] / Plan: [place] MEDICARE PART A & B / Product Type: Medicare /    In time: 1:46 pm        Out time: 2:43pm  Total Treatment Time (min): 57  Total Timed Codes (min): 47  1:1 Treatment Time (min): 47  Visit #: 6 of 8-10    Treatment Area: Pain in left knee [M25.562]    SUBJECTIVE  Pain Level (0-10 scale):  2  Any medication changes, allergies to medications, adverse drug reactions, diagnosis change, or new procedure performed?: [x] No    [] Yes (see summary sheet for update)  Subjective functional status/changes:   [] No changes reported  Patient reports his knee is bothering him today, no dull pinch, but a sharp shooting pain, he has not been wearing his knee brace today  OBJECTIVE  Modality rationale: decrease inflammation and decrease pain to improve the patients ability to perform ADLs     Min Type Additional Details   10 [] Estim: []Att   [x]Unatt                  []IFC  []Premod                                            [x]NMES 10s/5soff 2 s ramp  []Other:  [x]w/ice   []w/heat  Position: long sit reclined w bolster under knees  Location: L knee    []  Traction: [] Cervical       [] Lumbar                       [] Supine        [] Prone                       []Intermittent   []Continuous Lbs:  [] before manual  [] after manual    []  Ultrasound: []Continuous   [] Pulsed                           []1MHz   []3MHz Location:  W/cm2:   0 [x]  Iontophoresis with dexamethasone         Location: (L) anteromedial knee [x] Take home patch   [] In clinic    []  Ice     []  heat  []  Ice massage Position:reclined long sit Location: L knee    []  Vasopneumatic Device Pressure: [] lo [] med [] hi   Temp: [] lo [] med [] hi   [x] Skin assessment post-treatment:  [x]intact    []redness- no adverse reaction []redness - adverse reaction:     29 min Therapeutic Exercise:  [x] See flow sheet: added SLR   Rationale: increase ROM, increase strength and improve coordination to improve the patients ability to perform ADLs, increase walking tolerance      10 min Neuromuscular Re-education:  [x]  See flow sheet:    Rationale: improve coordination, improve balance and increase proprioception  to improve the patients ability to negotiate hurdles, transfer     min Gait Trainin feet x 1 req distant S and intermittent SBA (sidestepping, tandem walking)   Rationale: increase proprioception, improve balance strategies, increase strength to improve the patient's ability to ambulate with reduced fall risk    8 min Therapeutic Activity:  []  See flow sheet :   Rationale: increase strength and improve coordination  to improve the patients ability to transfer in and out of chairs, navigate stairs, and walk        with TE min Patient Education: [x] Review HEP     Other Objective/Functional Measures: Added 2x4 balance, prone HS curl    Pain Level (0-10 scale) post treatment: 0 \" numb\"    ASSESSMENT/Changes in Function:   Program was scaled to Pt tolerance today as he is having inc sx today and ambulating with visibly antalgic gait. Pt was instructed in modified program to avoid re aggravating injury. Pt tolerated table exercises well reporting no inc in sx with NWB exercises. Pt required min vc with familiar exercises to perform correctly. Pt attempted sit to stand from raised tx table however was inc sx so was halted, step ups were deferred today due to likelihood of exercise aggravating knee pain. Pt continues to respond well to NMES with SAQ and cold pack demonstrating improved VMO recruitment with stim.      Patient will continue to benefit from skilled PT services to modify and progress therapeutic interventions, address functional mobility deficits, address ROM deficits, address strength deficits, analyze and address soft tissue restrictions, analyze and cue movement patterns, analyze and modify body mechanics/ergonomics and assess and modify postural abnormalities to attain remaining goals. [x]  See Plan of Care  []  See progress note/recertification  []  See Discharge Summary         Progress towards goals / Updated goals: · Short Term Goals: To be accomplished in  1  weeks:  1. Pt will be independent and compliant with HEP. -Goal met; pt notes compliance (7/24/20)  · Long Term Goals: To be accomplished in  8-10  treatments:  1. Patient will increase FOTO score to 68/100 for indications of increased functional mobility. 2.  Patient will demo 4/5 knee extension strength for progression of quad stability with SL activities   3. Patient will demo negative SLR quad lag for improved quad strength to decrease fall risk Progressing with NMES to improve quad recruitment 8/5/20  4.  Patient will report increased walking tolerance to 17 min before onset of limping to increased community mobility Pt reports 5-6 mins 8/10/20  PLAN  [x]  Upgrade activities as tolerated     [x]  Continue plan of care  []  Update interventions per flow sheet       []  Discharge due to:_  []  Other:    Jolynn Stable 8/10/2020

## 2020-08-12 ENCOUNTER — HOSPITAL ENCOUNTER (OUTPATIENT)
Dept: PHYSICAL THERAPY | Age: 30
Discharge: HOME | End: 2020-08-12
Payer: MEDICARE

## 2020-08-12 PROCEDURE — 97530 THERAPEUTIC ACTIVITIES: CPT

## 2020-08-12 PROCEDURE — 97014 ELECTRIC STIMULATION THERAPY: CPT

## 2020-08-12 PROCEDURE — 97110 THERAPEUTIC EXERCISES: CPT

## 2020-08-12 PROCEDURE — 97112 NEUROMUSCULAR REEDUCATION: CPT

## 2020-08-12 NOTE — PROGRESS NOTES
PT DAILY TREATMENT NOTE     Patient Name: Wilfred Ortega  Date:2020  : 1990  [x]  Patient  Verified  Payor: [place] MED[place] / Plan: [place] MED[place]ARE [place] A & B / Product Type: Medicare /    In time: 1:53 pm        Out time: 253pm  Total Treatment Time (min): 60  Total Timed Codes (min): 50  1:1 Treatment Time (min): 50  Visit #: 7 of 8-10    Treatment Area: Pain in left knee [M25.562]    SUBJECTIVE  Pain Level (0-10 scale): 3  Any medication changes, allergies to medications, adverse drug reactions, diagnosis change, or new procedure performed?: [x] No    [] Yes (see summary sheet for update)  Subjective functional status/changes:   [] No changes reported  Patient reports his knee is more sore today, he has not been doing anything different.  His knee was bothering him when he woke up so he iced it which helped  OBJECTIVE  Modality rationale: decrease inflammation and decrease pain to improve the patients ability to perform ADLs     Min Type Additional Details   10 [] Estim: []Att   [x]Unatt                  []IFC  []Premod                                            [x]NMES 10s/5soff 2 s ramp  []Other:  [x]w/ice   []w/heat  Position: long sit reclined w bolster under knees  Location: L knee    []  Traction: [] Cervical       [] Lumbar                       [] Supine        [] Prone                       []Intermittent   []Continuous Lbs:  [] before manual  [] after manual    []  Ultrasound: []Continuous   [] Pulsed                           []1MHz   []3MHz Location:  W/cm2:   0 [x]  Iontophoresis with dexamethasone         Location: (L) anteromedial knee [x] Take home patch   [] In clinic    []  Ice     []  heat  []  Ice massage Position:reclined long sit Location: L knee    []  Vasopneumatic Device Pressure: [] lo [] med [] hi   Temp: [] lo [] med [] hi   [x] Skin assessment post-treatment:  [x]intact    []redness- no adverse reaction []redness - adverse reaction:     27 min Therapeutic Exercise:  [x] See flow sheet: added SLR   Rationale: increase ROM, increase strength and improve coordination to improve the patients ability to perform ADLs, increase walking tolerance      10 min Neuromuscular Re-education:  [x]  See flow sheet:    Rationale: improve coordination, improve balance and increase proprioception  to improve the patients ability to negotiate hurdles, transfer     min Gait Trainin feet x 1 req distant S and intermittent SBA (sidestepping, tandem walking)   Rationale: increase proprioception, improve balance strategies, increase strength to improve the patient's ability to ambulate with reduced fall risk    8 min Therapeutic Activity:  []  See flow sheet :   Rationale: increase strength and improve coordination  to improve the patients ability to transfer in and out of chairs, navigate stairs, and walk     5 NC min Manual Therapy:  Cross friction to L patellar tendon   Rationale: decrease pain and increase tissue extensibility to improve pt ability to walk       with TE min Patient Education: [x] Review HEP     Other Objective/Functional Measures: Added stool scoots, wobble board taps, SB HS curls, toe towel crunches  Progressed bridges with stability ball  Returned to leg press at regressed weight    Pain Level (0-10 scale) post treatment: 1    ASSESSMENT/Changes in Function:   Exercises were scaled to Pt tolerance today as he presents with inc pain today. He did not have pain following last session. Session today focused on exercises that were not aggravating to the knee. Pt was able to tolerate stool scoots, wobble board taps, and staiblity ball bridges and hamstring curls without inc sx. Pt was noted to have lateral wear pattern on PREMA shoes prompting Pt to assess pt feet- Pt was noted to have flat feet PREMA.  PT addressed flat feet with toe towel crunches and suggested Pt look into purchasing arch supports such as Dr Keshav Holbrook. Pt edu re foot position/funciton affecting knees. Pt verbalized understanding. Pt was also instructed to ice proactively 2-3x per day at home to address tendonitis. Tendonitis was also addressed with manual therapy today, pt responded well to manual therapy reporting dec sx following tx. PT was progressed with quad recruitment/strength and NMES today by being instructed to perform SAQ vs quad set. Patient will continue to benefit from skilled PT services to modify and progress therapeutic interventions, address functional mobility deficits, address ROM deficits, address strength deficits, analyze and address soft tissue restrictions, analyze and cue movement patterns, analyze and modify body mechanics/ergonomics and assess and modify postural abnormalities to attain remaining goals. [x]  See Plan of Care  []  See progress note/recertification  []  See Discharge Summary         Progress towards goals / Updated goals: · Short Term Goals: To be accomplished in  1  weeks:  1. Pt will be independent and compliant with HEP. -Goal met; pt notes compliance (7/24/20)  · Long Term Goals: To be accomplished in  8-10  treatments:  1. Patient will increase FOTO score to 68/100 for indications of increased functional mobility. 2.  Patient will demo 4/5 knee extension strength for progression of quad stability with SL activities   3. Patient will demo negative SLR quad lag for improved quad strength to decrease fall risk Progressing with NMES and SAQ to improve quad recruitment/strength 8/12/20  4.  Patient will report increased walking tolerance to 17 min before onset of limping to increased community mobility Pt reports 5-6 mins 8/10/20  PLAN  [x]  Upgrade activities as tolerated     [x]  Continue plan of care  []  Update interventions per flow sheet       []  Discharge due to:_  []  Other:    Lm Jarvis 8/12/2020

## 2020-08-17 ENCOUNTER — HOSPITAL ENCOUNTER (OUTPATIENT)
Dept: PHYSICAL THERAPY | Age: 30
Discharge: HOME | End: 2020-08-17
Payer: MEDICARE

## 2020-08-17 PROCEDURE — 97110 THERAPEUTIC EXERCISES: CPT

## 2020-08-17 PROCEDURE — 97112 NEUROMUSCULAR REEDUCATION: CPT

## 2020-08-17 PROCEDURE — 97014 ELECTRIC STIMULATION THERAPY: CPT

## 2020-08-17 NOTE — PROGRESS NOTES
PT DAILY TREATMENT NOTE     Patient Name: Tyra Arias  Date:2020  : 1990  [x]  Patient  Verified  Payor: [place] MEDICARE / Plan: [place] MEDICARE PART A & B / Product Type: Medicare /    In time: 11:29 pm        Out time: 12:06pm  Total Treatment Time (min): 37  Total Timed Codes (min): 37  1:1 Treatment Time (min): 27  Visit #: 8 of 8-10    Treatment Area: Pain in left knee [M25.562]    SUBJECTIVE  Pain Level (0-10 scale): 5-6  Any medication changes, allergies to medications, adverse drug reactions, diagnosis change, or new procedure performed?: [x] No    [] Yes (see summary sheet for update)  Subjective functional status/changes:   [] No changes reported  Patient reports his knee was sore following the last session, and then unfortunately a dog ran into his knee from the side which made him lose his balance. The dog running into his knee was very painful, he did not get a pop and his leg didn't give out, but the dog put him off balance when it ran into his knee.    OBJECTIVE  Modality rationale: decrease inflammation and decrease pain to improve the patients ability to perform ADLs     Min Type Additional Details   10 [] Estim: []Att   [x]Unatt                  [x]IFC  []Premod                                            []NMES 10s/5soff 2 s ramp  []Other:  [x]w/ice   []w/heat  Position: long sit reclined w bolster under knees  Location: L knee    []  Traction: [] Cervical       [] Lumbar                       [] Supine        [] Prone                       []Intermittent   []Continuous Lbs:  [] before manual  [] after manual    []  Ultrasound: []Continuous   [] Pulsed                           []1MHz   []3MHz Location:  W/cm2:   0 [x]  Iontophoresis with dexamethasone         Location: (L) anteromedial knee [x] Take home patch   [] In clinic    []  Ice     []  heat  []  Ice massage Position:reclined long sit Location: L knee    []  Vasopneumatic Device Pressure: [] lo [] med [] hi   Temp: [] lo [] med [] hi   [x] Skin assessment post-treatment:  [x]intact    []redness- no adverse reaction                                                                                 []redness - adverse reaction:     17 min Therapeutic Exercise:  [x] See flow sheet: added SLR   Rationale: increase ROM, increase strength and improve coordination to improve the patients ability to perform ADLs, increase walking tolerance      10 min Neuromuscular Re-education:  [x]  See flow sheet:    Rationale: improve coordination, improve balance and increase proprioception  to improve the patients ability to negotiate hurdles, transfer    0 min Gait Training:     Rationale: increase proprioception, improve balance strategies, increase strength to improve the patient's ability to ambulate with reduced fall risk    0 min Therapeutic Activity:  []  See flow sheet :   Rationale: increase strength and improve coordination  to improve the patients ability to transfer in and out of chairs, navigate stairs, and walk     0 min Manual Therapy:  Cross friction to L patellar tendon   Rationale: decrease pain and increase tissue extensibility to improve pt ability to walk       with TE min Patient Education: [x] Review HEP     Other Objective/Functional Measures:   Regressed all exercises due to recent reinjury      Pain Level (0-10 scale) post treatment: 3    ASSESSMENT/Changes in Function:   Exercises were regressed today to Pt tolerance as pt reported significant pain inc prior to starting session from having a dog run into his knee. Pt was instructed in long sit quad set, long sit HS set, to improved mm activation and blood flow to promote healing. Pt required tc and vc with hs set to modify exercise to isometric hold vs heel slide as heel slides were painful . PPt reported compliance with inc icing to 2x per day, reported dec in sx for about 20 min following icing at home.  IFC was utilized in place of NMES today to address pain control as Pt presented with inc pain today. Pt responded well to IFC and ice reporting dec sx following tx. Patient will continue to benefit from skilled PT services to modify and progress therapeutic interventions, address functional mobility deficits, address ROM deficits, address strength deficits, analyze and address soft tissue restrictions, analyze and cue movement patterns, analyze and modify body mechanics/ergonomics and assess and modify postural abnormalities to attain remaining goals. [x]  See Plan of Care  []  See progress note/recertification  []  See Discharge Summary         Progress towards goals / Updated goals: · Short Term Goals: To be accomplished in  1  weeks:  1. Pt will be independent and compliant with HEP. -Goal met; pt notes compliance (7/24/20)  · Long Term Goals: To be accomplished in  8-10  treatments:  1. Patient will increase FOTO score to 68/100 for indications of increased functional mobility. 2.  Patient will demo 4/5 knee extension strength for progression of quad stability with SL activities   3. Patient will demo negative SLR quad lag for improved quad strength to decrease fall risk Progressing with NMES and SAQ to improve quad recruitment/strength 8/12/20  4.  Patient will report increased walking tolerance to 17 min before onset of limping to increased community mobility Pt reports 5-6 mins 8/10/20  PLAN  [x]  Upgrade activities as tolerated     [x]  Continue plan of care  []  Update interventions per flow sheet       []  Discharge due to:_  []  Other:    Laura Riley 8/17/2020

## 2020-08-19 ENCOUNTER — HOSPITAL ENCOUNTER (OUTPATIENT)
Dept: PHYSICAL THERAPY | Age: 30
Discharge: HOME | End: 2020-08-19
Payer: MEDICARE

## 2020-08-19 PROCEDURE — 97110 THERAPEUTIC EXERCISES: CPT

## 2020-08-19 PROCEDURE — 97112 NEUROMUSCULAR REEDUCATION: CPT

## 2020-08-19 PROCEDURE — 97530 THERAPEUTIC ACTIVITIES: CPT

## 2020-08-19 NOTE — PROGRESS NOTES
PT DAILY TREATMENT NOTE     Patient Name: Cherylynn Eisenmenger  Date:2020  : 1990  [x]  Patient  Verified  Payor: [place] MEDICARE / Plan: [place] MEDICARE PART A & B / Product Type: Medicare /    In time: 11:30 pm        Out time: 12:28  Total Treatment Time (min): 58  Total Timed Codes (min): 48  1:1 Treatment Time (min): 48  Visit #: 9 of 8-10    Treatment Area: Pain in left knee [M25.562]    SUBJECTIVE  Pain Level (0-10 scale): 3  Any medication changes, allergies to medications, adverse drug reactions, diagnosis change, or new procedure performed?: [x] No    [] Yes (see summary sheet for update)  Subjective functional status/changes:   [] No changes reported  Patient reports his knee is better today than last visit, he can tell he has been getting stronger, but the knee continues to be painful. He would like to continue with therapy as he has been getting stronger.   OBJECTIVE  Modality rationale: decrease inflammation and decrease pain to improve the patients ability to perform ADLs     Min Type Additional Details   10 [] Estim: []Att   [x]Unatt                  [x]IFC  []Premod                                            []NMES 10s/5soff 2 s ramp  []Other:  [x]w/ice   []w/heat  Position: long sit reclined w bolster under knees  Location: L knee    []  Traction: [] Cervical       [] Lumbar                       [] Supine        [] Prone                       []Intermittent   []Continuous Lbs:  [] before manual  [] after manual    []  Ultrasound: []Continuous   [] Pulsed                           []1MHz   []3MHz Location:  W/cm2:   0 [x]  Iontophoresis with dexamethasone         Location: (L) anteromedial knee [x] Take home patch   [] In clinic    []  Ice     []  heat  []  Ice massage Position:reclined long sit Location: L knee    []  Vasopneumatic Device Pressure: [] lo [] med [] hi   Temp: [] lo [] med [] hi   [x] Skin assessment post-treatment:  [x]intact    []redness- no adverse reaction []redness - adverse reaction:     27 min Therapeutic Exercise:  [x] See flow sheet: added SLR add   Rationale: increase ROM, increase strength and improve coordination to improve the patients ability to perform ADLs, increase walking tolerance      10 min Neuromuscular Re-education:  [x]  See flow sheet: quad set, TKE   Rationale: improve coordination, improve balance and increase proprioception  to improve the patients ability to negotiate hurdles, transfer    0 min Gait Training:     Rationale: increase proprioception, improve balance strategies, increase strength to improve the patient's ability to ambulate with reduced fall risk    8 min Therapeutic Activity:  []  See flow sheet : bridging   Rationale: increase strength and improve coordination  to improve the patients ability to transfer in and out of chairs, navigate stairs, and walk     3 min Manual Therapy:  Patellar lateral tilt mobilization Gr III   Rationale: decrease pain and increase tissue extensibility to improve pt ability to walk       with TE min Patient Education: [x] Review HEP     Other Objective/Functional Measures:   Able to return to bodyweight exercises. Pain Level (0-10 scale) post treatment: 3    ASSESSMENT/Changes in Function:   Pt was able to return to bodyweight exercises today progressing from last visit where all exercises were regressed due to injury from dog running into leg. Pt required min vc with familiar exercises to perform correctly. Pt continues to have constant pain with all exercises, however they do not inc pain. At this point PT is considering attempting cupping for fascial decompression and improved blood flow as cupping has not been utilized yet. Pt did undergo ultrasound during last round of therapy in February with limited results. PT and pt discussed POC as pt pain has not improved, and function has only minimally progressed.  Pt requested to continue with therapy as he does feel his strengh has progressed and he would like to trial cupping. Patient will continue to benefit from skilled PT services to modify and progress therapeutic interventions, address functional mobility deficits, address ROM deficits, address strength deficits, analyze and address soft tissue restrictions, analyze and cue movement patterns, analyze and modify body mechanics/ergonomics and assess and modify postural abnormalities to attain remaining goals. [x]  See Plan of Care  []  See progress note/recertification  []  See Discharge Summary         Progress towards goals / Updated goals: · Short Term Goals: To be accomplished in  1  weeks:  1. Pt will be independent and compliant with HEP. -Goal met; pt notes compliance (7/24/20)  · Long Term Goals: To be accomplished in  8-10  treatments:  1. Patient will increase FOTO score to 68/100 for indications of increased functional mobility. 2.  Patient will demo 4/5 knee extension strength for progression of quad stability with SL activities   3. Patient will demo negative SLR quad lag for improved quad strength to decrease fall risk Progressing with NMES and SAQ to improve quad recruitment/strength 8/12/20  4.  Patient will report increased walking tolerance to 17 min before onset of limping to increased community mobility Pt reports 5-6 mins 8/10/20  PLAN  [x]  Upgrade activities as tolerated     [x]  Continue plan of care  []  Update interventions per flow sheet       []  Discharge due to:_  []  Other:    Kole Husbands 8/19/2020

## 2020-08-19 NOTE — PROGRESS NOTES
[address] [facility] [address] [facility] [address]. [facility] [address]  Phone: [phone] Fax: [phone]  PROGRESS NOTE  Patient Name: Carlie Pacheco : 1990   Treatment/Medical Diagnosis: Pain in left knee [M25.562]   Referral Source: Charis Kanner, DO     Date of Initial Visit: 20 Attended Visits: 9 Missed Visits: 3     SUMMARY OF TREATMENT  Pt is a 26year old M referred for tx of L knee pain. Treatment has consisted of the following: Therapeutic exercise, Therapeutic activities, Neuromuscular re-education, Physical agent/modality, Gait/balance training, Manual therapy, Patient education, Self Care training, Functional mobility training. CURRENT STATUS  Pt does demonstrate minimal gains in ankle ROM and hip strength which has improved his ability to walk. Mild funcitonal improvements are also evidenced by improved FOTO score. Pt progress is slowed due to intermittent, inconsistent exacerbations in sx, and also due to recent mild re injury from dog running into Pt knee. Progress is also likely slow due to chronicity of the condition as the original injury happened () and he has had multiple injuries since ( falls etc). PT is requesting to continue for 1-2x 2 weeks ( 4 visits) to trial cupping and ultrasound to address fascial restrictions that may be contributing to Pt impairments.     Functional Gains: improved strength , slight improved walking  Functional Deficits: continued pain, difficulty with walking, stairs, standing, squatting.   % improvement: 25-30% with strength  Pain   Average: 4/10                  Best: 1/10                Worst: 8/10  Patient Goal: \"walk better, less pain in the knee\"     Gait:                Antalgic, shortened stride on R, dec knee flex and stance on L, dec heel strike L     ROM :  Knee 0-133 with pain with flexion , pain with full ext  Ankle: DF 10 deg      Strength :  Hip: flexion 3+, ABD 4 extension 4  Knee: flexion 4+, extension 3 inc pain on resistance , quad lag +, able to fully extend with pain   Core: bridge 95%,      Flexibility:   Hamstrings:  90/90 HS test +  Quadriceps: Fauzia Test +  Gastroc:   +     Palpation: tender to light touch medial jt line, superior medial patellar border     Girth Measurements:   Quad tone : L 41, R 42.5       Cm at joint line   Left 38   Right  38             Other tests/comments:  30 sec sit to stand : NV    Goal/Measure of Progress Goal Met? 1. Pt will be independent and compliant with HEP   Status at last Eval: na Current Status: I c current HEP yes, needs progression   2. Patient will increase FOTO score to 68/100 for indications of increased functional mobility. Status at last Eval: 49 Current Status: 52 no, progressing   3. Patient will demo negative SLR quad lag for improved quad strength to decrease fall risk    Status at last Eval: Quad lag + Current Status: Able to correct with vc and pain no, progressing   4. Patient will report increased walking tolerance to 17 min before onset of limping to increased community mobility   Status at last Eval: 0 mins Current Status: 5-6 mins no, progressing     New Goals to be achieved in __4__  treatments: continue toward unmet goals and TKE goal  1. Patient will increase FOTO score to 68/100 for indications of increased functional mobility. 2.  Patient will demo 4/5 knee extension strength for progression of quad stability with SL activities   3. Patient will demo negative SLR quad lag, and 4/5 quad strength to decrease fall risk   4. Patient will report increased walking tolerance to 17 min before onset of limping to increased community mobility  5. Pt will demo TKE without pain to tolerate TKE for proper gait mechanics.      RECOMMENDATIONS  Continue for 1-2 x 2 weeks    If you have any questions/comments please contact us directly at [phone]   Thank you for allowing us to assist in the care of your patient. Therapist Signature: Laura Riley Date: 8/19/2020   Reporting Period  7/20/20-8/18/20 Time: 9:20 AM   NOTE TO PHYSICIAN:  PLEASE COMPLETE THE ORDERS BELOW AND FAX TO   [facility]: [phone]. If you are unable to process this request in 24 hours please contact our office: [phone]. _X_ I have read the above report and request that my patient continue as recommended.   ___ I have read the above report and request that my patient continue therapy with the following changes/special instructions:_________________________________________________________   ___ I have read the above report and request that my patient be discharged from therapy.      Physician Signature:        Date:       Time:

## 2020-09-01 ENCOUNTER — HOSPITAL ENCOUNTER (OUTPATIENT)
Dept: PHYSICAL THERAPY | Age: 30
Discharge: HOME | End: 2020-09-01
Payer: MEDICARE

## 2020-09-01 PROCEDURE — 97530 THERAPEUTIC ACTIVITIES: CPT

## 2020-09-01 PROCEDURE — 97014 ELECTRIC STIMULATION THERAPY: CPT

## 2020-09-01 PROCEDURE — 97110 THERAPEUTIC EXERCISES: CPT

## 2020-09-01 PROCEDURE — 97112 NEUROMUSCULAR REEDUCATION: CPT

## 2020-09-01 NOTE — PROGRESS NOTES
PT DAILY TREATMENT NOTE     Patient Name: Truman Freitas  Date:2020  : 1990  [x]  Patient  Verified  Payor: Emery Ramires / Plan: [place] / Product Type: Medicare /    In time: 9:57 am        Out time: 11:01 am  Total Treatment Time (min): 64  Total Timed Codes (min): 54  1:1 Treatment Time (min): 10:00am-10:45am (45)  Visit #: 1 of 4    Treatment Area: Pain in left knee [M25.562]    SUBJECTIVE  Pain Level (0-10 scale): 2  Any medication changes, allergies to medications, adverse drug reactions, diagnosis change, or new procedure performed?: [x] No    [] Yes (see summary sheet for update)  Subjective functional status/changes:   [] No changes reported  Patient notes less overall pain today, although he notes he was less active over the weekend.     OBJECTIVE  Modality rationale: decrease inflammation and decrease pain to improve the patients ability to perform ADLs     Min Type Additional Details   10 [x] Estim: []Att   [x]Unatt                  [x]IFC  []Premod                                            []NMES 10s/5soff 2 s ramp  []Other:  [x]w/ice   []w/heat  Position: long-sit reclined w bolster under knees  Location: (L) knee    []  Traction: [] Cervical       [] Lumbar                       [] Supine        [] Prone                       []Intermittent   []Continuous Lbs:  [] before manual  [] after manual    []  Ultrasound: []Continuous   [] Pulsed                           []1MHz   []3MHz Location:  W/cm2:    []  Iontophoresis with dexamethasone         Location:  [] Take home patch   [] In clinic    []  Ice     []  heat  []  Ice massage Position:reclined long sit Location: L knee    []  Vasopneumatic Device Pressure: [] lo [] med [] hi   Temp: [] lo [] med [] hi   [x] Skin assessment post-treatment:  [x]intact    []redness- no adverse reaction                                                                                 []redness - adverse reaction:     27 min Therapeutic Exercise:  [x] See flow sheet:    Rationale: increase ROM, increase strength and improve coordination to improve the patients ability to perform ADLs, increase walking tolerance      12 min Neuromuscular Re-education:  [x]  See flow sheet:   Rationale: improve coordination, improve balance and increase proprioception  to improve the patients ability to negotiate hurdles, transfer    8 min Therapeutic Activity:  [x]  See flow sheet:    Rationale: increase strength and improve coordination  to improve the patients ability to transfer in and out of chairs, navigate stairs, and walk     7 min Manual Therapy:  IASTM cupping to (L) peripatellar; patellar lateral tilt mobilization grade III   Rationale: decrease pain and increase tissue extensibility to improve pt ability to walk     with TE min Patient Education: [x] Review HEP     Other Objective/Functional Measures:       Pain Level (0-10 scale) post treatment: 1    ASSESSMENT/Changes in Function:   Good response to IASTM cupping to improve myofascial mobility. (+) for lateral patellar tilting. Crepitus noted with patellar mobilizations. Patient will continue to benefit from skilled PT services to modify and progress therapeutic interventions, address functional mobility deficits, address ROM deficits, address strength deficits, analyze and address soft tissue restrictions, analyze and cue movement patterns, analyze and modify body mechanics/ergonomics and assess and modify postural abnormalities to attain remaining goals. [x]  See Plan of Care  []  See progress note/recertification  []  See Discharge Summary         Progress towards goals / Updated goals: · Short Term Goals: To be accomplished in  1  weeks:  1. Pt will be independent and compliant with HEP. -Goal met; pt notes compliance (7/24/20)  · Long Term Goals: To be accomplished in  8-10  treatments:  1. Patient will increase FOTO score to 68/100 for indications of increased functional mobility. 2.  Patient will demo 4/5 knee extension strength for progression of quad stability with SL activities   3. Patient will demo negative SLR quad lag for improved quad strength to decrease fall risk Progressing with NMES and SAQ to improve quad recruitment/strength 8/12/20  4.  Patient will report increased walking tolerance to 17 min before onset of limping to increased community mobility Pt reports 5-6 mins 8/10/20  PLAN  [x]  Upgrade activities as tolerated     [x]  Continue plan of care  []  Update interventions per flow sheet       []  Discharge due to:_  []  Other:    Crystal Sylvester, PTA 9/1/2020

## 2020-09-03 ENCOUNTER — HOSPITAL ENCOUNTER (OUTPATIENT)
Dept: PHYSICAL THERAPY | Age: 30
Discharge: HOME | End: 2020-09-03
Payer: MEDICARE

## 2020-09-03 PROCEDURE — 97112 NEUROMUSCULAR REEDUCATION: CPT

## 2020-09-03 PROCEDURE — 97110 THERAPEUTIC EXERCISES: CPT

## 2020-09-03 PROCEDURE — 97014 ELECTRIC STIMULATION THERAPY: CPT

## 2020-09-03 PROCEDURE — 97530 THERAPEUTIC ACTIVITIES: CPT

## 2020-09-03 NOTE — PROGRESS NOTES
PT DAILY TREATMENT NOTE     Patient Name: Fawn   Date:9/3/2020  : 1990  [x]  Patient  Verified  Payor: Libertad Chavez / Plan: [place] MEDICARE PART A & B / Product Type: Medicare /    In time: 9:59 am       Out time: 10:53 am  Total Treatment Time (min): 54  Total Timed Codes (min): 44  1:1 Treatment Time (min): 42  Visit #: 2 of 4    Treatment Area: Pain in left knee [M25.562]    SUBJECTIVE  Pain Level (0-10 scale): 2  Any medication changes, allergies to medications, adverse drug reactions, diagnosis change, or new procedure performed?: [x] No    [] Yes (see summary sheet for update)  Subjective functional status/changes:   [] No changes reported  Patient reports minimal change in pain since last session, however, having less overall pain today.     OBJECTIVE  Modality rationale: decrease inflammation and decrease pain to improve the patients ability to perform ADLs     Min Type Additional Details   10 [x] Estim: []Att   [x]Unatt                  [x]IFC  []Premod                                            []NMES   []Other:  [x]w/ice   []w/heat  Position: long-sit reclined w bolster under knees  Location: (L) knee    []  Traction: [] Cervical       [] Lumbar                       [] Supine        [] Prone                       []Intermittent   []Continuous Lbs:  [] before manual  [] after manual    []  Ultrasound: []Continuous   [] Pulsed                           []1MHz   []3MHz Location:  W/cm2:    []  Iontophoresis with dexamethasone         Location:  [] Take home patch   [] In clinic    []  Ice     []  heat  []  Ice massage Position:reclined long sit Location: L knee    []  Vasopneumatic Device Pressure: [] lo [] med [] hi   Temp: [] lo [] med [] hi   [x] Skin assessment post-treatment:  [x]intact    []redness- no adverse reaction                                                                                 []redness - adverse reaction:     18 min Therapeutic Exercise:  [x] See flow sheet: Rationale: increase ROM, increase strength and improve coordination to improve the patients ability to perform ADLs, increase walking tolerance      9 min Neuromuscular Re-education:  [x]  See flow sheet:   Rationale: improve coordination, improve balance and increase proprioception  to improve the patients ability to negotiate hurdles, transfer    8 min Therapeutic Activity:  [x]  See flow sheet: added wall sits to improve weight shifting with transfers   Rationale: increase strength and improve coordination  to improve the patients ability to transfer in and out of chairs, navigate stairs, and walk     9 (NC) min Manual Therapy: stick-rolling STM to (L) VL and rectus femoris f/b patellar lateral tilt mobilization grade III; (L) knee PROM with tibial mobs   Rationale: decrease pain and increase tissue extensibility to improve pt ability to walk     with TE min Patient Education: [x] Review HEP     Other Objective/Functional Measures:   Stance: increased left toe out  Palpation: lateral left patellar tilt; difficult to mobilize with CFM to the lateral extensor retinaculum with medial manual tilting  (L) knee AROM: WNL; mild stiffness into extension with pain, likely calf tightness  (-) left anterior drawer test    Pain Level (0-10 scale) post treatment: 1    ASSESSMENT/Changes in Function:   Patient continues with good overall knee mobility with most difficulty and pain during knee flexion attempts in weight bearing. Noted minimal calcaneal eversion with gait, (L) > (R). Patient will continue to benefit from skilled PT services to modify and progress therapeutic interventions, address functional mobility deficits, address ROM deficits, address strength deficits, analyze and address soft tissue restrictions, analyze and cue movement patterns, analyze and modify body mechanics/ergonomics and assess and modify postural abnormalities to attain remaining goals.      [x]  See Plan of Care  []  See progress note/recertification  []  See Discharge Summary         Progress towards goals / Updated goals: · Short Term Goals: To be accomplished in  1  weeks:  1. Patient will be independent and compliant with HEP. -Goal met; pt notes compliance (7/24/20)  · Long Term Goals: To be accomplished in  8-10  treatments:  1. Patient will increase FOTO score to 68/100 for indications of increased functional mobility. 2.  Patient will demo 4/5 knee extension strength for progression of quad stability with SL activities   3. Patient will demo negative SLR quad lag for improved quad strength to decrease fall risk Progressing with NMES and SAQ to improve quad recruitment/strength 8/12/20  4.   Patient will report increased walking tolerance to 17 min before onset of limping to increased community mobility Pt reports 5-6 mins 8/10/20      PLAN  [x]  Upgrade activities as tolerated     [x]  Continue plan of care  []  Update interventions per flow sheet       []  Discharge due to:_  [x]  Other: consider assessing calcaneal mobility ANDREAS Starks, PTA 9/3/2020

## 2020-09-09 ENCOUNTER — HOSPITAL ENCOUNTER (OUTPATIENT)
Dept: PHYSICAL THERAPY | Age: 30
Discharge: HOME | End: 2020-09-09
Payer: MEDICARE

## 2020-09-09 PROCEDURE — 97014 ELECTRIC STIMULATION THERAPY: CPT

## 2020-09-09 PROCEDURE — 97112 NEUROMUSCULAR REEDUCATION: CPT

## 2020-09-09 PROCEDURE — 97110 THERAPEUTIC EXERCISES: CPT

## 2020-09-09 PROCEDURE — 97530 THERAPEUTIC ACTIVITIES: CPT

## 2020-09-09 NOTE — PROGRESS NOTES
PT DAILY TREATMENT NOTE     Patient Name: Geneva Reading  Date:2020  : 1990  [x]  Patient  Verified  Payor: [place] MEDICARE / Plan: [place] MEDICARE PART A & B / Product Type: Medicare /    In time: 11:27 am       Out time: 12:23 am  Total Treatment Time (min): 56  Total Timed Codes (min): 46  1:1 Treatment Time (min): 46  Visit #: 3 of 4    Treatment Area: Pain in left knee [M25.562]    SUBJECTIVE  Pain Level (0-10 scale):  4  Any medication changes, allergies to medications, adverse drug reactions, diagnosis change, or new procedure performed?: [x] No    [] Yes (see summary sheet for update)  Subjective functional status/changes:   [] No changes reported  Patient reports that he tried walking in a pool over the weekend per his mothers suggestion for his knee, but it didn't help.      OBJECTIVE  Modality rationale: decrease inflammation and decrease pain to improve the patients ability to perform ADLs     Min Type Additional Details   10 [x] Estim: []Att   [x]Unatt                  []IFC  []Premod                                            [x]NMES 10s on 5s off 5s ramp  []Other:  []w/ice   [x]w/heat  Position: long sit with quad set  Location: (L) knee    []  Traction: [] Cervical       [] Lumbar                       [] Supine        [] Prone                       []Intermittent   []Continuous Lbs:  [] before manual  [] after manual    []  Ultrasound: []Continuous   [] Pulsed                           []1MHz   []3MHz Location:  W/cm2:    []  Iontophoresis with dexamethasone         Location:  [] Take home patch   [] In clinic    []  Ice     []  heat  []  Ice massage Position:reclined long sit Location: L knee    []  Vasopneumatic Device Pressure: [] lo [] med [] hi   Temp: [] lo [] med [] hi   [x] Skin assessment post-treatment:  [x]intact    []redness- no adverse reaction                                                                                 []redness - adverse reaction:     21 min Therapeutic Exercise:  [x] See flow sheet:    Rationale: increase ROM, increase strength and improve coordination to improve the patients ability to perform ADLs, increase walking tolerance      12 min Neuromuscular Re-education:  [x]  See flow sheet:   Rationale: improve coordination, improve balance and increase proprioception  to improve the patients ability to negotiate hurdles, transfer    8 min Therapeutic Activity:  [x]  See flow sheet: added wall sits to improve weight shifting with transfers   Rationale: increase strength and improve coordination  to improve the patients ability to transfer in and out of chairs, navigate stairs, and walk     5 (NC) min Manual Therapy: KT taping to unweight patellar tendon,    Rationale: decrease pain and increase tissue extensibility to improve pt ability to walk     with TE min Patient Education: [x] Review HEP     Other Objective/Functional Measures: Added prone quad set, HR    Pain Level (0-10 scale) post treatment: 1    ASSESSMENT/Changes in Function:   Pt and PT discussed POC with pt electing to wait until after his MD appt on 9/14/20 to make a decision on whether to continue with PT as pt has been demonstrating limited progression, and sx have not resolved in spite of utilizing multiple modalities and treatment strategies. PT assessed calcaneal mobility, calcaneus rests in inversion likely due to PREMA flat feet. KT taping was tested in patellar un weighting pattern to dec loading through patellar tendon and improve patellar tracking, followed by NMES to improve VMO activation and contraction for improved patellar tracking.     Patient will continue to benefit from skilled PT services to modify and progress therapeutic interventions, address functional mobility deficits, address ROM deficits, address strength deficits, analyze and address soft tissue restrictions, analyze and cue movement patterns, analyze and modify body mechanics/ergonomics and assess and modify postural abnormalities to attain remaining goals. [x]  See Plan of Care  []  See progress note/recertification  []  See Discharge Summary         Progress towards goals / Updated goals: · Short Term Goals: To be accomplished in  1  weeks:  1. Patient will be independent and compliant with HEP. -Goal met; pt notes compliance (7/24/20)  · Long Term Goals: To be accomplished in  8-10  treatments:  1. Patient will increase FOTO score to 68/100 for indications of increased functional mobility. 2.  Patient will demo 4/5 knee extension strength for progression of quad stability with SL activities 9/9/20 Pt continues to have pain and limitation with TKE  3. Patient will demo negative SLR quad lag for improved quad strength to decrease fall risk Progressing with NMES and SAQ to improve quad recruitment/strength 8/12/20  4.   Patient will report increased walking tolerance to 17 min before onset of limping to increased community mobility Pt reports 5-6 mins 8/10/20      PLAN  [x]  Upgrade activities as tolerated     [x]  Continue plan of care  []  Update interventions per flow sheet       []  Discharge due to:_  [x]  Other: Reassess ANDREAS Neil Stable 9/9/2020

## 2020-09-10 ENCOUNTER — APPOINTMENT (OUTPATIENT)
Dept: PHYSICAL THERAPY | Age: 30
End: 2020-09-10
Payer: MEDICARE

## 2020-09-11 ENCOUNTER — APPOINTMENT (OUTPATIENT)
Dept: PHYSICAL THERAPY | Age: 30
End: 2020-09-11
Payer: MEDICARE

## 2020-09-14 ENCOUNTER — OFFICE VISIT (OUTPATIENT)
Dept: ORTHOPEDIC SURGERY | Age: 30
End: 2020-09-14

## 2020-09-14 VITALS
WEIGHT: 177 LBS | BODY MASS INDEX: 28.45 KG/M2 | RESPIRATION RATE: 15 BRPM | SYSTOLIC BLOOD PRESSURE: 150 MMHG | HEART RATE: 101 BPM | HEIGHT: 66 IN | DIASTOLIC BLOOD PRESSURE: 77 MMHG | TEMPERATURE: 98.1 F

## 2020-09-14 DIAGNOSIS — M25.862 IMPINGEMENT SYNDROME INVOLVING PATELLAR FAT PAD OF LEFT KNEE: ICD-10-CM

## 2020-09-14 DIAGNOSIS — M22.2X2 PATELLOFEMORAL DISORDER, LEFT: Primary | ICD-10-CM

## 2020-09-14 DIAGNOSIS — M70.50 PES ANSERINE BURSITIS: ICD-10-CM

## 2020-09-14 RX ORDER — PREDNISONE 20 MG/1
TABLET ORAL
Qty: 28 TAB | Refills: 0 | Status: SHIPPED | OUTPATIENT
Start: 2020-09-14 | End: 2020-11-23 | Stop reason: ALTCHOICE

## 2020-09-14 RX ORDER — DEXTROAMPHETAMINE SACCHARATE, AMPHETAMINE ASPARTATE, DEXTROAMPHETAMINE SULFATE AND AMPHETAMINE SULFATE 1.25; 1.25; 1.25; 1.25 MG/1; MG/1; MG/1; MG/1
TABLET ORAL
COMMUNITY
Start: 2020-08-31

## 2020-09-14 NOTE — PROGRESS NOTES
AVS reviewed: YES  HEP: N/A  Resources Provided: YES AVS & patellar stabilizing brace.  Pt was fitted w/ brace, confirmed comfort & fit, and ambulated appropriately w/ DME prior to D/C  Patient questions/concerns answered: NO. Pt denied questions/concerns  Patient verbalized understanding of treatment plan: YES

## 2020-09-14 NOTE — PROGRESS NOTES
HISTORY OF PRESENT ILLNESS    Trena Boxer 1990 is a 26y.o. year old male comes in today to be evaluated and treated for: left knee pain    Since last appt has noticed minimal improvement PT 6 weeks. Pain level 3/10. Using voltaren gel with benefit. Pain most anyteriro and back of lower leg. Worse with being touched. IMAGING: MRI left knee 7/22/2020  Menisci and major ligaments intact. Borderline prominent tibial tubercle-trochlear groove interval and lateral patellar tilt. Correlate clinically for patellar maltracking.  -Mild patellar/quadriceps fat pad edema, likely impingement. XR left knee 2/5/20  No significant abnormality. History reviewed. No pertinent surgical history. Social History     Socioeconomic History    Marital status: UNKNOWN     Spouse name: Not on file    Number of children: Not on file    Years of education: Not on file    Highest education level: Not on file   Tobacco Use    Smoking status: Never Smoker    Smokeless tobacco: Never Used   Substance and Sexual Activity    Alcohol use: Not Currently    Drug use: Not Currently     Current Outpatient Medications   Medication Sig Dispense Refill    dextroamphetamine-amphetamine (ADDERALL) 5 mg tablet take 1 tablet by mouth every morning for 7 days then 2 tablets every morning for 16 DAYS      diclofenac (VOLTAREN) 1 % gel Apply 4 g to affected area every six (6) hours as needed for Pain (left knee). 300 g 1    naproxen (NAPROSYN) 500 mg tablet Take 500 mg by mouth two (2) times daily (with meals).  mirtazapine (Remeron) 45 mg tablet Take 45 mg by mouth nightly.  meloxicam (MOBIC) 15 mg tablet Take 1 tab daily as needed pain with food. 80 Tab 0     Past Medical History:   Diagnosis Date    Autism     Depression      History reviewed. No pertinent family history.       ROS:  No swell, bruise    Objective:  BP (!) 150/77   Pulse (!) 101   Temp 98.1 °F (36.7 °C)   Resp 15   Ht 5' 6\" (1.676 m)   Wt 177 lb (80.3 kg)   BMI 28.57 kg/m²   GEN: Appears stated age in NAD. HEAD:  Normocephalic, atraumatic. NEURO:  Sensation intact light touch B/L lower extremities. MS:  LE Strength +5/5 bilateral .   left Knee:  1+ Effusion .  Lachman negative.  Valgus negative.  Varus negative.  negative joint line tenderness medial.  Dharmesh negative.  Posterior drawer negative.  Noble compression test negative.  Patellar apprehension negative.  Patellar facet positive tender to palpation medial no crepitance left.  Pes anserine significant TTP left and at fat pad. EXT: no clubbing/cyanosis.  no edema. SKIN: Warm/dry without rash    Assessment/Plan:     ICD-10-CM ICD-9-CM    1. Patellofemoral disorder, left  M22.2X2 719.96 predniSONE (DELTASONE) 20 mg tablet      AMB SUPPLY ORDER   2. Pes anserine bursitis  M70.50 726.61 predniSONE (DELTASONE) 20 mg tablet      AMB SUPPLY ORDER   3. Impingement syndrome involving patellar fat pad of left knee  M25.862 719.86 predniSONE (DELTASONE) 20 mg tablet      AMB SUPPLY ORDER       Patient (or guardian if minor) verbalizes understanding of evaluation and plan. Will start prednisone taper w/ patellar stabilizing brace and RTC 4 weeks.

## 2020-09-14 NOTE — PATIENT INSTRUCTIONS
Continue with exercises, wear brace, use medication as prescribed, and return to the office in about 4 weeks.

## 2020-09-16 ENCOUNTER — HOSPITAL ENCOUNTER (OUTPATIENT)
Dept: PHYSICAL THERAPY | Age: 30
Discharge: HOME | End: 2020-09-16
Payer: MEDICARE

## 2020-09-16 PROCEDURE — 97530 THERAPEUTIC ACTIVITIES: CPT

## 2020-09-16 PROCEDURE — 97112 NEUROMUSCULAR REEDUCATION: CPT

## 2020-09-16 PROCEDURE — 97110 THERAPEUTIC EXERCISES: CPT

## 2020-09-16 NOTE — PROGRESS NOTES
[address] [facility] AT [address] [place], [place] [address]  Phone: [phone] Fax: [phone]  PROGRESS NOTE  Patient Name: Iain Arnold : 1990   Treatment/Medical Diagnosis: Pain in left knee [M25.562]   Referral Source: Carlos Henriquez DO     Date of Initial Visit: 20 Attended Visits: 13 Missed Visits: 2     SUMMARY OF TREATMENT  Pt is a 26year old M referred for tx of L knee pain. Treatment has consisted of the following: Therapeutic exercise, Therapeutic activities, Neuromuscular re-education, Physical agent/modality, Gait/balance training, Manual therapy, Patient education, Self Care training, Functional mobility training, HEP. CURRENT STATUS  Pt reports 45% overall improvement with functional ADL's since beginning PT. Pt's pain range 1-8/10. Average pain is a 3/10. Patient reports the following functional improvements in therapy: increased ambulation times with decreased pain. Patient continues to need work on: sleeping due to pain, continue to improve standing (<10min) and ambulation distances/times (1-2 blocks), stair negotiation (descending>ascending) and decrease overall knee pain. Stairs: reciprocal pattern both ascending and descending with increased pain descending    Goal/Measure of Progress Goal Met? 1. Patient will increase FOTO score to 68/100 for indications of increased functional mobility. Status at last PN: 52/100 on 20 Current Status: 44/100 no   2. Patient will demo 4/5 knee extension strength for progression of quad stability with SL. Status at last Eval: 3/5 inc pain on resistance Current Status: 4-/5 inc pain on resistance Progressing   3. Patient will demo negative SLR quad lag, and 4/5 quad strength to decrease fall risk. Status at last Eval: Quad lag + Current Status: Minimal Quad lag + Progressing   4.   Patient will report increased walking tolerance to 17 min before onset of limping to increased community mobility. Status at last Eval: 5-6 minutes max Current Status: 10 minutes max Progressing     5. Pt will demo TKE without pain to tolerate TKE for proper gait mechanics. Status at last Eval: Able to correct to [facility] and p! Current Status: With p! Progressing       New Goals to be achieved in __4__  treatments:  1. Patient will be independent with advanced HEP for discharge. 2.  Patient will increase FOTO score to 68/100 for indications of increased functional mobility. 3.  Patient will report increased walking tolerance to 17 min before onset of limping to increased community mobility. 4.  Patient will demo negative SLR quad lag, and 4/5 quad strength to decrease fall risk. RECOMMENDATIONS  Continue PT per MD recommendation. If you have any questions/comments please contact us directly at [phone]. Thank you for allowing us to assist in the care of your patient. LUMA Signature: LUMA Voss Date: 9/16/2020   PT Signature: Renate Christie DPT  Time: 6:00 PM   NOTE TO PHYSICIAN:  PLEASE COMPLETE THE ORDERS BELOW AND FAX TO   [facility]: [phone]. If you are unable to process this request in 24 hours please contact our office: [phone]. _X_ I have read the above report and request that my patient continue as recommended.   ___ I have read the above report and request that my patient continue therapy with the following changes/special instructions:_________________________________________________________   ___ I have read the above report and request that my patient be discharged from therapy.      Physician Signature:        Date:       Time:

## 2020-09-16 NOTE — PROGRESS NOTES
PT DAILY TREATMENT NOTE     Patient Name: Trena Boxer  Date:2020  : 1990  [x]  Patient  Verified  Payor: Beto Sher / Plan: [place] MED[place] & B / Product Type: Medicare /    In time: 4:15     Out time: 4:56  Total Treatment Time (min): 41  Total Timed Codes (min): 41  1:1 Treatment Time (min): 41  Visit #: 4 of 4    Treatment Area: Pain in left knee [M25.562]    SUBJECTIVE  Pain Level (0-10 scale):  2/10  Any medication changes, allergies to medications, adverse drug reactions, diagnosis change, or new procedure performed?: [x] No    [] Yes (see summary sheet for update)  Subjective functional status/changes:   [] No changes reported  I saw the doctor on the  and he gave me an antiinflammatory and said to keep coming to PT. Pt reports 45% overall improvement with functional ADL's since beginning PT. Pt's pain range 1-8/10. Average pain is a 3/10. Patient has demonstrated the following functional improvements in therapy: increased ambulation times with decreased pain. Patient continues to need work on: sleeping due to pain, continue to improve standing (<10min) and ambulation distances/times (1-2 blocks), stair negotiation (descending>ascending) and decrease overall knee pain.       OBJECTIVE  Modality rationale: decrease inflammation and decrease pain to improve the patients ability to perform ADLs     Min Type Additional Details   10+1 set up [x] Estim: []Att   [x]Unatt                  []IFC  []Premod                                            [x]NMES 10s on 5s off 5s ramp  []Other:  [x]w/ice   []w/heat  Position: long sit with quad set  Location: (L) knee    []  Traction: [] Cervical       [] Lumbar                       [] Supine        [] Prone                       []Intermittent   []Continuous Lbs:  [] before manual  [] after manual    []  Ultrasound: []Continuous   [] Pulsed                           []1MHz   []3MHz Location:  W/cm2:    []  Iontophoresis with dexamethasone         Location:  [] Take home patch   [] In clinic    []  Ice     []  heat  []  Ice massage Position:reclined long sit Location: L knee    []  Vasopneumatic Device Pressure: [] lo [] med [] hi   Temp: [] lo [] med [] hi   [x] Skin assessment post-treatment:  [x]intact    []redness- no adverse reaction                                                                                 []redness - adverse reaction:     10 min Therapeutic Exercise:  [x] See flow sheet:    Rationale: increase ROM, increase strength and improve coordination to improve the patients ability to perform ADLs, increase walking tolerance      10 min Neuromuscular Re-education:  [x]  See flow sheet:   Rationale: improve coordination, improve balance and increase proprioception  to improve the patients ability to negotiate hurdles, transfer    10 min Therapeutic Activity:  [x]  See flow sheet: wall sits and bridges; FOTO and review goals   Rationale: increase strength and improve coordination  to improve the patients ability to transfer in and out of chairs, navigate stairs, and walk     - min Manual Therapy: KT taping to unweight patellar tendon,    Rationale: decrease pain and increase tissue extensibility to improve pt ability to walk     with TE min Patient Education: [x] Review HEP     Other Objective/Functional Measures: FOTO: 44/100 (52/100 at last PN)  Left knee extension MMT: 4-/5 p!   Left knee extension AROM: 0 deg    Stairs: reciprocal ascending/descending, increased p! with descending     Pain Level (0-10 scale) post treatment: 3/10    ASSESSMENT/Changes in Function:   SEE PROGRESS NOTE    Patient will continue to benefit from skilled PT services to modify and progress therapeutic interventions, address functional mobility deficits, address ROM deficits, address strength deficits, analyze and address soft tissue restrictions, analyze and cue movement patterns, analyze and modify body mechanics/ergonomics and assess and modify postural abnormalities to attain remaining goals. []  See Plan of Care  [x]  See progress note/recertification  []  See Discharge Summary         Progress towards goals / Updated goals: · Short Term Goals: To be accomplished in  1  weeks:  1. Patient will be independent and compliant with HEP. -Goal met; pt notes compliance (7/24/20)    · Long Term Goals: To be accomplished in  8-10  treatments:  1. Patient will increase FOTO score to 68/100 for indications of increased functional mobility. 44/100 on 9/16/20  2. Patient will demo 4/5 knee extension strength for progression of quad stability with SL activities 9/9/20 Pt continues to have pain and limitation with TKE, 4-/5 on 9/16/20  3. Patient will demo negative SLR quad lag for improved quad strength to decrease fall risk Progressing with NMES and SAQ to improve quad recruitment/strength 8/12/20,  Minimal quad lag on 9/16/20  4.   Patient will report increased walking tolerance to 17 min before onset of limping to increased community mobility Pt reports 5-6 mins 8/10/20, 10 min 9/16/20      PLAN  [x]  Upgrade activities as tolerated     [x]  Continue plan of care  []  Update interventions per flow sheet       []  Discharge due to:_  [x]  Other: continue PT per MD recommendation    Marvell Spatz, LPTA 9/16/2020

## 2020-09-28 ENCOUNTER — HOSPITAL ENCOUNTER (OUTPATIENT)
Dept: PHYSICAL THERAPY | Age: 30
Discharge: HOME | End: 2020-09-28
Payer: MEDICARE

## 2020-09-28 PROCEDURE — 97110 THERAPEUTIC EXERCISES: CPT

## 2020-09-28 PROCEDURE — 97530 THERAPEUTIC ACTIVITIES: CPT

## 2020-09-28 PROCEDURE — 97014 ELECTRIC STIMULATION THERAPY: CPT

## 2020-09-28 PROCEDURE — 97112 NEUROMUSCULAR REEDUCATION: CPT

## 2020-09-28 NOTE — PROGRESS NOTES
PT DAILY TREATMENT NOTE     Patient Name: Rosalva Tucker  Date:2020  : 1990  [x]  Patient  Verified  Payor: [place] MEDICARE / Plan: [place] MEDICARE PART A & B / Product Type: Medicare /    In time:9:56  Out time:10:39  Total Treatment Time (min): 43  Total Timed Codes (min): 33  1:1 Treatment Time (min): 33   Visit #: 1 of 8-10    Treatment Area: Pain in left knee [M25.562]    SUBJECTIVE  Pain Level (0-10 scale): 0  Any medication changes, allergies to medications, adverse drug reactions, diagnosis change, or new procedure performed?: [x] No    [] Yes (see summary sheet for update)  Subjective functional status/changes:   [] No changes reported  No pain today. Feeling tired. The brace feels like it's helping me in pretty much everything.    Walking tolerance: <5 min; the knee gives out     OBJECTIVE  Modality rationale: increase muscle contraction/control to improve the patients ability to walk with improved patterning    Min Type Additional Details   10 [x] Estim: []Att   [x]Unatt        []TENS instruct                  []IFC  []Premod   [x]NMES 10 on and 5s off with 5s ramp                       []Other:  [x]w/US   []w/ice   []w/heat  Position: long sit with quad  Location: L knee   [x] Skin assessment post-treatment:  [x]intact []redness- no adverse reaction       []redness - adverse reaction:       13 min Therapeutic Exercise:  [x] See flow sheet :   Rationale: increase ROM and increase strength to improve the patients ability to walk further    2 min Manual Therapy:  KT taping for patellar unweighting   Rationale: increase ROM to improve mobility, walking tolerance                                                                                                                                                                                                                                     10 min Therapeutic Activity:  [x]  See flow sheet : wall sits and bridges; progressed bridges to march to improve t/f and bed mobility. Rationale:  Increase strength, improve coordination to t/f into chairs, stairs, walking      8 min Neuromuscular Re-education:  [x]  See flow sheet : initiate SLS for balance and walking safety;    Rationale: improve coordination, improve balance and increase proprioception  to improve the patients ability to walk safely in the community             x min Patient Education: [x] Review HEP    [] Progressed/Changed HEP based on:   [] positioning   [] body mechanics   [] transfers   [] heat/ice application        Other Objective/Functional Measures:     Pain Level (0-10 scale) post treatment: 0    ASSESSMENT/Changes in Function: Goals assessed last visit for PN and progressing appropriately alt this time. Good tolerance to increased TE. Patient will continue to benefit from skilled PT services to modify and progress therapeutic interventions, address functional mobility deficits, address ROM deficits, address strength deficits, analyze and address soft tissue restrictions, analyze and cue movement patterns, analyze and modify body mechanics/ergonomics, assess and modify postural abnormalities, address imbalance/dizziness and instruct in home and community integration to attain remaining goals. []  See Plan of Care  []  See progress note/recertification  []  See Discharge Summary         Progress towards goals / Updated goals:  1. Patient will be independent with advanced HEP for discharge. 2.  Patient will increase FOTO score to 68/100 for indications of increased functional mobility. 3.  Patient will report increased walking tolerance to 17 min before onset of limping to increased community mobility. 4.  Patient will demo negative SLR quad lag, and 4/5 quad strength to decrease fall risk.  Pt demo's improved quad control with SLR today with min lag (9/28/2020)       PLAN  [x]  Upgrade activities as tolerated     []  Continue plan of care  []  Update interventions per flow sheet       []  Discharge due to:_  []  Other:_      Quin Butcher, PT 9/28/2020  8:30 AM    Future Appointments   Date Time Provider Krysten Catalan   9/28/2020 10:00 AM Vamshi Calvin, PT [facility]   9/30/2020  9:15 AM Chani Amaral [facility]   10/5/2020  2:00 PM Chidi Banda [facility]   10/7/2020  2:45 PM Cleveland Randolph PTA [facility]   10/12/2020  3:00 PM Derrek Goldberg, DO [facility]

## 2020-09-30 ENCOUNTER — HOSPITAL ENCOUNTER (OUTPATIENT)
Dept: PHYSICAL THERAPY | Age: 30
Discharge: HOME | End: 2020-09-30
Payer: MEDICARE

## 2020-09-30 PROCEDURE — 97110 THERAPEUTIC EXERCISES: CPT

## 2020-09-30 PROCEDURE — 97112 NEUROMUSCULAR REEDUCATION: CPT

## 2020-09-30 PROCEDURE — 97014 ELECTRIC STIMULATION THERAPY: CPT

## 2020-09-30 PROCEDURE — 97530 THERAPEUTIC ACTIVITIES: CPT

## 2020-09-30 NOTE — PROGRESS NOTES
PT DAILY TREATMENT NOTE     Patient Name: Maricarmen Walls  Date:2020  : 1990  [x]  Patient  Verified  Payor: [place] MEDICARE / Plan: [place] MEDICARE PART A & B / Product Type: Medicare /    In time:9:15  Out time:10:11  Total Treatment Time (min): 56  Total Timed Codes (min): 42  1:1 Treatment Time (min): 46  Visit #: 2of 8-10    Treatment Area: Pain in left knee [M25.562]    SUBJECTIVE  Pain Level (0-10 scale): 2  Any medication changes, allergies to medications, adverse drug reactions, diagnosis change, or new procedure performed?: [x] No    [] Yes (see summary sheet for update)  Subjective functional status/changes:   [] No changes reported  Pt reports that the only time his knee feels ok is with the brace on    OBJECTIVE  Modality rationale: increase muscle contraction/control to improve the patients ability to walk with improved patterning    Min Type Additional Details   10 [x] Estim: []Att   [x]Unatt        []TENS instruct                  []IFC  []Premod   [x]NMES 10 on and 5s off with 5s ramp                       []Other:  [x]w/US   []w/ice   []w/heat  Position: long sit with quad  Location: L knee   [x] Skin assessment post-treatment:  [x]intact []redness- no adverse reaction       []redness - adverse reaction:       22 min Therapeutic Exercise:  [x] See flow sheet :   Rationale: increase ROM and increase strength to improve the patients ability to walk further    2 min Manual Therapy:  KT taping for patellar unweighting   Rationale: increase ROM to improve mobility, walking tolerance                                                                                                                                                                                                                                     10 min Therapeutic Activity:  [x]  See flow sheet : wall sits and bridges; progressed bridges to march to improve t/f and bed mobility.     Rationale:  Increase strength, improve coordination to t/f into chairs, stairs, walking      8 min Neuromuscular Re-education:  [x]  See flow sheet : initiate SLS for balance and walking safety;    Rationale: improve coordination, improve balance and increase proprioception  to improve the patients ability to walk safely in the community             x min Patient Education: [x] Review HEP    [] Progressed/Changed HEP based on: 4 mins spent reviewing HEP with Pt  [] positioning   [] body mechanics   [] transfers   [] heat/ice application        Other Objective/Functional Measures:  Updated HEP     Pain Level (0-10 scale) post treatment: 1-2/10    ASSESSMENT/Changes in Function: HEP was updated to current therex program. 4 mins were spent with Pt reviewing HEP and instructing Pt in self KT taping. Pt was also issued red t band and instructed on utilization with SLR to progress strengthening as tolerated at home, he was not able to tolerate inc resistance with the exercise today. He was able to tolerate small progressions with balance training with inc rep of SLS and progression of tandem to 1/2 FR. Pt attempted heel and toe raises today however had inc in knee pain so exercise was deferred. Patient will continue to benefit from skilled PT services to modify and progress therapeutic interventions, address functional mobility deficits, address ROM deficits, address strength deficits, analyze and address soft tissue restrictions, analyze and cue movement patterns, analyze and modify body mechanics/ergonomics, assess and modify postural abnormalities, address imbalance/dizziness and instruct in home and community integration to attain remaining goals. []  See Plan of Care  []  See progress note/recertification  []  See Discharge Summary         Progress towards goals / Updated goals:  1. Patient will be independent with advanced HEP for discharge. Updated 9/30/20   2.   Patient will increase FOTO score to 68/100 for indications of increased functional mobility. 3.  Patient will report increased walking tolerance to 17 min before onset of limping to increased community mobility. 4.  Patient will demo negative SLR quad lag, and 4/5 quad strength to decrease fall risk.  Pt demo's improved quad control with SLR today with min lag (9/28/2020)       PLAN  [x]  Upgrade activities as tolerated     []  Continue plan of care  []  Update interventions per flow sheet       []  Discharge due to:_  []  Other:_      Alba Keita 9/30/2020  8:30 AM    Future Appointments   Date Time Provider Krysten Catalan   9/30/2020  9:15 AM Tiara Álvarez [facility]   10/5/2020  2:00 PM Maite Lares [facility]   10/7/2020  2:45 PM Duane Mccrary PTA [facility]   10/12/2020  3:00 PM DO BETY Mustafa [facility]

## 2020-10-05 ENCOUNTER — HOSPITAL ENCOUNTER (OUTPATIENT)
Dept: PHYSICAL THERAPY | Age: 30
Discharge: HOME | End: 2020-10-05
Payer: MEDICARE

## 2020-10-05 PROCEDURE — 97530 THERAPEUTIC ACTIVITIES: CPT

## 2020-10-05 PROCEDURE — 97110 THERAPEUTIC EXERCISES: CPT

## 2020-10-05 PROCEDURE — 97112 NEUROMUSCULAR REEDUCATION: CPT

## 2020-10-05 NOTE — PROGRESS NOTES
PT DAILY TREATMENT NOTE     Patient Name: Yessy Zhao  Date:10/5/2020  : 1990  [x]  Patient  Verified  Payor: [place] MEDICARE / Plan: [place] MEDICARE PART A & B / Product Type: Medicare /    In time: 2:00 pm                Out time: 2:51 pm  Total Treatment Time (min): 51  Total Timed Codes (min): 41  1:1 Treatment Time (min): 41  Visit #: 3 of -    Treatment Area: Pain in left knee [M25.562]    SUBJECTIVE  Pain Level (0-10 scale): 0  Any medication changes, allergies to medications, adverse drug reactions, diagnosis change, or new procedure performed?: [x] No    [] Yes (see summary sheet for update)  Subjective functional status/changes:   [] No changes reported  Patient states he is feeling better as he is in a better state of mind. He notes k-taping is not as effective as his knee brace and he reports wearing it throughout the day. Patient notes walking his dog sporadically for 15-20 minutes.     OBJECTIVE  Modality rationale: increase muscle contraction/control to improve the patients ability to walk with improved patterning    Min Type Additional Details   10 [x] Ice Position: long-sit  Location: (L) knee   [x] Skin assessment post-treatment:  [x]intact []redness- no adverse reaction       []redness - adverse reaction:     15 min Therapeutic Exercise:  [x] See flow sheet:   Rationale: increase ROM and increase strength to improve the patients ability to walk further    4 min Manual Therapy:  manual left side-lying (L) hip flexor and rectus femoris stretching   Rationale: increase ROM to improve mobility, walking tolerance                               10 min Therapeutic Activity:  [x]  See flow sheet: added clams to improve turning in bed   Rationale: increase strength, improve coordination to t/f into chairs, stairs, walking      12 min Neuromuscular Re-education:  [x]  See flow sheet:   Rationale: improve coordination, improve balance and increase proprioception  to improve the patients ability to walk safely in the community           X min Patient Education: [x] Review HEP       Other Objective/Functional Measures:  (-) quad lag with SLR  Walking tolerance: 15-20 minutes while walking dog  (+) Xiomara Webber for hip flexor and rectus femoris stiffness     Pain Level (0-10 scale) post treatment: 0    ASSESSMENT/Changes in Function:   Improved quad strength as evidenced by (-) quad lag with SLR. Patient informed of pending D/C NV and agreeable; will finalize HEP NV for D/C to self-management of symptoms. Patient will continue to benefit from skilled PT services to modify and progress therapeutic interventions, address functional mobility deficits, address ROM deficits, address strength deficits, analyze and address soft tissue restrictions, analyze and cue movement patterns, analyze and modify body mechanics/ergonomics, assess and modify postural abnormalities, address imbalance/dizziness and instruct in home and community integration to attain remaining goals. [x]  See Plan of Care  []  See progress note/recertification  []  See Discharge Summary         Progress towards goals / Updated goals:  1. Patient will be independent with advanced HEP for discharge. Updated 9/30/20   2. Patient will increase FOTO score to 68/100 for indications of increased functional mobility. 3.  Patient will report increased walking tolerance to 17 min before onset of limping to increased community mobility. -Goal progressing; 15-20 minutes while walking the dog (10/5/20)   4. Patient will demo negative SLR quad lag, and 4/5 quad strength to decrease fall risk.  -Goal progressing; (-) quad lag with SLR (10/5/20)     PLAN  [x]  Upgrade activities as tolerated     [x]  Continue plan of care  []  Update interventions per flow sheet       []  Discharge due to:_  [x]  Other: reassess goals for D/C NV    Gillian Aiken PTA 10/5/2020    Future Appointments   Date Time Provider Krysten Catalan   10/5/2020  2:00 PM Jo Berry, [place] [facility]   10/7/2020  2:45 [facility]   10/12/2020  3:00 PM DO BETY Barney BS AM[facility]

## 2020-10-07 ENCOUNTER — TELEPHONE (OUTPATIENT)
Dept: ORTHOPEDIC SURGERY | Age: 30
End: 2020-10-07

## 2020-10-07 ENCOUNTER — HOSPITAL ENCOUNTER (OUTPATIENT)
Dept: PHYSICAL THERAPY | Age: 30
Discharge: HOME | End: 2020-10-07
Payer: MEDICARE

## 2020-10-07 PROCEDURE — 97110 THERAPEUTIC EXERCISES: CPT

## 2020-10-07 PROCEDURE — 97112 NEUROMUSCULAR REEDUCATION: CPT

## 2020-10-07 PROCEDURE — 97530 THERAPEUTIC ACTIVITIES: CPT

## 2020-10-07 NOTE — PROGRESS NOTES
PT DAILY TREATMENT NOTE     Patient Name: Yordan Rubalcava  Date:10/7/2020  : 1990  [x]  Patient  Verified  Payor: [place] MEDICARE / Plan: [place] MEDICARE PART A & B / Product Type: Medicare /    In time: 2:45 pm            Out time: 3:48 pm  Total Treatment Time (min): 63  Total Timed Codes (min): 53  1:1 Treatment Time (min): 48  Visit #: 4 of     Treatment Area: Pain in left knee [M25.562]    SUBJECTIVE  Pain Level (0-10 scale): 0  Any medication changes, allergies to medications, adverse drug reactions, diagnosis change, or new procedure performed?: [x] No    [] Yes (see summary sheet for update)  Subjective functional status/changes:   [] No changes reported  Patient reports doing well today. He was able to walk his dog this morning using his knee brace.     OBJECTIVE  Modality rationale: increase muscle contraction/control to improve the patients ability to walk with improved patterning    Min Type Additional Details   10 [x] Ice Position: long-sit  Location: (L) knee   [x] Skin assessment post-treatment:  [x]intact []redness- no adverse reaction       []redness - adverse reaction:     31 min Therapeutic Exercise:  [x] See flow sheet: assisted patient with FOTO completion   Rationale: increase ROM and increase strength to improve the patients ability to walk further    0 min Manual Therapy:  NT   Rationale: increase ROM to improve mobility, walking tolerance                               10 min Therapeutic Activity:  [x]  See flow sheet:    Rationale: increase strength, improve coordination to t/f into chairs, stairs, walking      12 min Neuromuscular Re-education:  [x]  See flow sheet:   Rationale: improve coordination, improve balance and increase proprioception  to improve the patients ability to walk safely in the community           X min Patient Education: [x] Review HEP for D/C       Other Objective/Functional Measures:  Subjective improvement of 70% in symptoms since IE reported. Improvements: prolonged walking, transfers, perceived balance  Deficits: squatting,   FOTO score: 60/100 (at last assessment, 44/100)  (L) knee A/PROM: within normal limits, 0-133 deg  (L) knee strength: ext 5/5, flex 4+/5  Static balance:   Sharpened romberg on 1/2 foam roll: (B) 30 seconds   SLS: (L) 25 seconds  Core strength: 90% bridge  Pain: (C) 0     Pain Level (0-10 scale) post treatment: 0    ASSESSMENT/Changes in Function:   See D/C. Patient will continue to benefit from skilled PT services to modify and progress therapeutic interventions, address functional mobility deficits, address ROM deficits, address strength deficits, analyze and address soft tissue restrictions, analyze and cue movement patterns, analyze and modify body mechanics/ergonomics, assess and modify postural abnormalities, address imbalance/dizziness and instruct in home and community integration to attain remaining goals. [x]  See Discharge Summary         Progress towards goals / Updated goals:  1. Patient will be independent with advanced HEP for discharge. -Goal met; HEP finalized today with patient reporting understanding   2. Patient will increase FOTO score to 68/100 for indications of increased functional mobility. -Goal not met; inc FOTO score to 60/100 from 48/100 at last assessment   3. Patient will report increased walking tolerance to 17 min before onset of limping to increased community mobility. -Goal met; patient notes 15-20 minutes while walking the dog   4. Patient will demo negative SLR quad lag, and 4/5 quad strength to decrease fall risk.  -Goal met; pt demos (-) quad lag and 5/5 knee extension strength      PLAN      [x]  Discharge due to: program complete    Jayshree Alonso PTA 10/7/2020    Future Appointments   Date Time Provider Krysten Catalan   10/7/2020  2:45 PM Maite [facility]   10/12/2020  3:00 PM DO BETY Mustafa BS AMB

## 2020-10-12 ENCOUNTER — OFFICE VISIT (OUTPATIENT)
Dept: ORTHOPEDIC SURGERY | Age: 30
End: 2020-10-12
Payer: MEDICARE

## 2020-10-12 VITALS
TEMPERATURE: 98.1 F | RESPIRATION RATE: 15 BRPM | HEART RATE: 85 BPM | BODY MASS INDEX: 28.54 KG/M2 | DIASTOLIC BLOOD PRESSURE: 74 MMHG | SYSTOLIC BLOOD PRESSURE: 120 MMHG | HEIGHT: 66 IN | WEIGHT: 177.6 LBS

## 2020-10-12 DIAGNOSIS — M22.2X2 PATELLOFEMORAL DISORDER, LEFT: ICD-10-CM

## 2020-10-12 DIAGNOSIS — M25.862 IMPINGEMENT SYNDROME INVOLVING PATELLAR FAT PAD OF LEFT KNEE: Primary | ICD-10-CM

## 2020-10-12 DIAGNOSIS — M25.862 IMPINGEMENT SYNDROME INVOLVING PATELLAR FAT PAD OF LEFT KNEE: ICD-10-CM

## 2020-10-12 DIAGNOSIS — M70.50 PES ANSERINE BURSITIS: ICD-10-CM

## 2020-10-12 PROCEDURE — G8432 DEP SCR NOT DOC, RNG: HCPCS | Performed by: FAMILY MEDICINE

## 2020-10-12 PROCEDURE — 99214 OFFICE O/P EST MOD 30 MIN: CPT | Performed by: FAMILY MEDICINE

## 2020-10-12 PROCEDURE — G8427 DOCREV CUR MEDS BY ELIG CLIN: HCPCS | Performed by: FAMILY MEDICINE

## 2020-10-12 PROCEDURE — G8417 CALC BMI ABV UP PARAM F/U: HCPCS | Performed by: FAMILY MEDICINE

## 2020-10-12 RX ORDER — DICLOFENAC SODIUM 10 MG/G
4 GEL TOPICAL
Qty: 300 G | Refills: 1 | Status: SHIPPED | OUTPATIENT
Start: 2020-10-12

## 2020-10-12 NOTE — PROGRESS NOTES
HISTORY OF PRESENT ILLNESS    Kg Russ 1990 is a 26y.o. year old male comes in today to be evaluated and treated for: left knee pain     Since last appt has noticed significant pain but better with knee brace for patellar stabilizing. Finished PT last week and more strength but still pain. Pain level 7/10. Using prednisone and voltaren gel with benefit prior. IMAGING: MRI left knee 7/22/2020  Menisci and major ligaments intact. Borderline prominent tibial tubercle-trochlear groove interval and lateral patellar tilt. Correlate clinically for patellar maltracking.  -Mild patellar/quadriceps fat pad edema, likely impingement.     XR left knee 2/5/20  No significant abnormality. History reviewed. No pertinent surgical history. Social History     Socioeconomic History    Marital status: UNKNOWN     Spouse name: Not on file    Number of children: Not on file    Years of education: Not on file    Highest education level: Not on file   Tobacco Use    Smoking status: Never Smoker    Smokeless tobacco: Never Used   Substance and Sexual Activity    Alcohol use: Not Currently    Drug use: Not Currently     Current Outpatient Medications   Medication Sig Dispense Refill    dextroamphetamine-amphetamine (ADDERALL) 5 mg tablet take 1 tablet by mouth every morning for 7 days then 2 tablets every morning for 16 DAYS      predniSONE (DELTASONE) 20 mg tablet Take 2 tabs in AM with food for 7 days then 1 tab in AM with food until gone 28 Tab 0    diclofenac (VOLTAREN) 1 % gel Apply 4 g to affected area every six (6) hours as needed for Pain (left knee). 300 g 1    naproxen (NAPROSYN) 500 mg tablet Take 500 mg by mouth two (2) times daily (with meals).  mirtazapine (Remeron) 45 mg tablet Take 45 mg by mouth nightly.  meloxicam (MOBIC) 15 mg tablet Take 1 tab daily as needed pain with food. 80 Tab 0     Past Medical History:   Diagnosis Date    Autism     Depression      History reviewed.  No pertinent family history. ROS:  No swell, bruise, numb    Objective:  /74   Pulse 85   Temp 98.1 °F (36.7 °C)   Resp 15   Ht 5' 6\" (1.676 m)   Wt 177 lb 9.6 oz (80.6 kg)   BMI 28.67 kg/m²   GEN: Appears stated age in NAD. HEAD:  Normocephalic, atraumatic. NEURO:  Sensation intact light touch B/L lower extremities. MS:  LE Strength +5/5 bilateral .   left Knee:  1+ Effusion .  Lachman negative.  Valgus negative.  Varus negative.  negative joint line tenderness medial.  Dharmesh negative.  Posterior drawer negative.  Noble compression test negative.  Patellar apprehension negative.  Patellar facet positive tender to palpation medial no crepitance left.  Pes anserine sno TTP but significant TTP left fat pad. EXT: no clubbing/cyanosis.  no edema. SKIN: Warm/dry without rash    Assessment/Plan:     ICD-10-CM ICD-9-CM    1. Impingement syndrome involving patellar fat pad of left knee  M25.862 719.86 MRI KNEE LT WO CONT      diclofenac (VOLTAREN) 1 % gel   2. Pes anserine bursitis  M70.50 726.61    3. Patellofemoral disorder, left  M22.2X2 719.96 MRI KNEE LT WO CONT      diclofenac (VOLTAREN) 1 % gel       Patient (or guardian if minor) verbalizes understanding of evaluation and plan. Will refill voltaren gel and continue patellar stabilizing brace. Await MRI and RTC after for results.

## 2020-10-12 NOTE — PROGRESS NOTES
AVS reviewed: YES  HEP: N/A  Resources Provided: YES AVS, MRI order & instruction  Patient questions/concerns answered: NO. Pt denied questions/concerns  Patient verbalized understanding of treatment plan: YES

## 2020-10-12 NOTE — PROGRESS NOTES
[facility] [address] [facility] AT [address]. [address]  Phone: [phone] Fax: [phone]  PROGRESS NOTE  Patient Name: Jaylene Amado : 1990   Treatment/Medical Diagnosis: Pain in left knee [M25.562]   Referral Source: Consuelo Velasquez DO     Date of Initial Visit: 20 Attended Visits: 17 Missed Visits: 2     SUMMARY OF TREATMENT  Pt is a 26year old M referred for tx of L knee pain. Treatment has consisted of the following: Therapeutic exercise, Therapeutic activities, Neuromuscular re-education, Physical agent/modality, Gait/balance training, Manual therapy, Patient education, Self Care training, Functional mobility training, HEP. CURRENT STATUS  Pt made good progress with PT over 17 visits reporting 0/10 pain the last 2 attended sessions. Patient has all the tools and knowledge needed to continue with progress via HEP. Assessment as follows:  ubjective improvement of 70% in symptoms since IE reported. Improvements: prolonged walking, transfers, perceived balance  Deficits: squatting,   FOTO score: 60/100 (at last assessment, 44/100)  (L) knee A/PROM: within normal limits, 0-133 deg  (L) knee strength: ext 5/5, flex 4+/5  Static balance:               Sharpened romberg on 1/2 foam roll: (B) 30 seconds               SLS: (L) 25 seconds  Core strength: 90% bridge  Pain: (C) 0         Progress towards goals / Updated goals:  1.  Patient will be independent with advanced HEP for discharge.  -Goal met; HEP finalized today with patient reporting understanding (progressive at last assessment )   2.  Patient will increase FOTO score to 68/100 for indications of increased functional mobility. -Goal not met; inc FOTO score to 60/100 from 48/100 at last assessment   3.  Patient will report increased walking tolerance to 17 min before onset of limping to increased community mobility. -Goal met; patient notes 15-20 minutes while walking the dog (<10 min at last assess)   4.  Patient will demo negative SLR quad lag, and 4/5 quad strength to decrease fall risk. -Goal met; pt demos (-) quad lag and 5/5 knee extension strength  (+ SLR at last assess)        RECOMMENDATIONS  DC sec to program complete and goals met/ progressing   If you have any questions/comments please contact us directly at [phone]. Thank you for allowing us to assist in the care of your patient.   Report Period  7/20/2020-10/7/2020  Date: 10/12/2020   PT Signature: Monique Joel DPT  Time: 666D

## 2020-10-12 NOTE — PATIENT INSTRUCTIONS
Continue with wearing brace, use medication as prescribe, maintain home exercises, get MRI completed, and return to the office after MRI for results. You should receive call to schedule the MRI. If have not been called to schedule within a couple of days, please call [phone] to schedule. After MRI is scheduled, please call our office and schedule follow-up appointment for 2-3 days after the date of the MRI.

## 2020-11-18 ENCOUNTER — HOSPITAL ENCOUNTER (OUTPATIENT)
Dept: MRI IMAGING | Age: 30
Discharge: HOME | End: 2020-11-18
Attending: FAMILY MEDICINE
Payer: MEDICARE

## 2020-11-18 PROCEDURE — 73721 MRI JNT OF LWR EXTRE W/O DYE: CPT

## 2020-11-23 ENCOUNTER — OFFICE VISIT (OUTPATIENT)
Dept: ORTHOPEDIC SURGERY | Age: 30
End: 2020-11-23
Payer: MEDICARE

## 2020-11-23 VITALS
HEIGHT: 66 IN | SYSTOLIC BLOOD PRESSURE: 119 MMHG | WEIGHT: 170 LBS | BODY MASS INDEX: 27.32 KG/M2 | TEMPERATURE: 97.1 F | HEART RATE: 96 BPM | DIASTOLIC BLOOD PRESSURE: 77 MMHG | RESPIRATION RATE: 15 BRPM

## 2020-11-23 DIAGNOSIS — M25.862 IMPINGEMENT SYNDROME INVOLVING PATELLAR FAT PAD OF LEFT KNEE: Primary | ICD-10-CM

## 2020-11-23 PROCEDURE — 99213 OFFICE O/P EST LOW 20 MIN: CPT | Performed by: FAMILY MEDICINE

## 2020-11-23 PROCEDURE — G8417 CALC BMI ABV UP PARAM F/U: HCPCS | Performed by: FAMILY MEDICINE

## 2020-11-23 PROCEDURE — G8432 DEP SCR NOT DOC, RNG: HCPCS | Performed by: FAMILY MEDICINE

## 2020-11-23 PROCEDURE — G8427 DOCREV CUR MEDS BY ELIG CLIN: HCPCS | Performed by: FAMILY MEDICINE

## 2020-11-23 NOTE — PROGRESS NOTES
HISTORY OF PRESENT ILLNESS    eGovany Bryson 1990 is a 26y.o. year old male comes in today to be evaluated and treated for: left knee pain    Since last appt has noticed continued pain and worsening despite PT and patellar stabilizing brace. Pain level 8/10. Using prednisone and voltaren gel prior with benefit. Did do PT July until October w/o relief/improvement. IMAGING: MRI left knee 11/18/2020  IMPRESSION:   1. Cluster of findings suggest patellar impingement, to include mild distal  quadriceps and proximal patellar insertional tendinosis, and edema throughout  the suprapatellar fat pad. No morphology to predispose patellar maltracking. 2. No additional findings of internal derangement otherwise throughout the left  knee. History reviewed. No pertinent surgical history. Social History     Socioeconomic History    Marital status: SINGLE     Spouse name: Not on file    Number of children: Not on file    Years of education: Not on file    Highest education level: Not on file   Tobacco Use    Smoking status: Never Smoker    Smokeless tobacco: Never Used   Substance and Sexual Activity    Alcohol use: Not Currently    Drug use: Not Currently     Current Outpatient Medications   Medication Sig Dispense Refill    diclofenac (VOLTAREN) 1 % gel Apply 4 g to affected area every six (6) hours as needed for Pain (left knee). 300 g 1    mirtazapine (Remeron) 45 mg tablet Take 45 mg by mouth nightly.  dextroamphetamine-amphetamine (ADDERALL) 5 mg tablet take 1 tablet by mouth every morning for 7 days then 2 tablets every morning for 16 DAYS      naproxen (NAPROSYN) 500 mg tablet Take 500 mg by mouth two (2) times daily (with meals).  meloxicam (MOBIC) 15 mg tablet Take 1 tab daily as needed pain with food. 80 Tab 0     Past Medical History:   Diagnosis Date    Autism     Depression      History reviewed. No pertinent family history.     ROS:  No swell, bruise, numb    Objective:  BP 119/77   Pulse 96   Temp 97.1 °F (36.2 °C)   Resp 15   Ht 5' 6\" (1.676 m)   Wt 170 lb (77.1 kg)   BMI 27.44 kg/m²   GEN: Appears stated age in NAD. HEAD:  Normocephalic, atraumatic. NEURO:  Sensation intact light touch B/L lower extremities. MS:  LE Strength +5/5 bilateral.   left Knee:  1+ Effusion .  Lachman negative.  Valgus negative.  Varus negative.  negative joint line tenderness medial.  Dharmesh negative.  Posterior drawer negative.  Noble compression test negative.  Patellar apprehension negative.  Patellar facet positive tender to palpation medial no crepitance left.  Pes anserine no TTP but significant TTP left fat pad. EXT: no clubbing/cyanosis.  no edema. SKIN: Warm/dry without rash    Assessment/Plan:     ICD-10-CM ICD-9-CM    1. Impingement syndrome involving patellar fat pad of left knee  M25.862 719.86 REFERRAL TO ORTHOPEDICS       Patient (or guardian if minor) verbalizes understanding of evaluation and plan. Will refer Dr. Guillermo Barnes for surgical eval as failed conservative.

## 2021-05-25 ENCOUNTER — OFFICE VISIT (OUTPATIENT)
Dept: ORTHOPEDIC SURGERY | Age: 31
End: 2021-05-25
Payer: MEDICARE

## 2021-05-25 VITALS
BODY MASS INDEX: 26.68 KG/M2 | TEMPERATURE: 97.5 F | HEIGHT: 66 IN | WEIGHT: 166 LBS | OXYGEN SATURATION: 100 % | HEART RATE: 91 BPM | RESPIRATION RATE: 15 BRPM

## 2021-05-25 DIAGNOSIS — M25.562 CHRONIC PAIN OF LEFT KNEE: Primary | ICD-10-CM

## 2021-05-25 DIAGNOSIS — M25.862 IMPINGEMENT SYNDROME INVOLVING PATELLAR FAT PAD OF LEFT KNEE: ICD-10-CM

## 2021-05-25 DIAGNOSIS — G89.29 CHRONIC PAIN OF LEFT KNEE: Primary | ICD-10-CM

## 2021-05-25 PROCEDURE — 73562 X-RAY EXAM OF KNEE 3: CPT | Performed by: PHYSICIAN ASSISTANT

## 2021-05-25 PROCEDURE — G8417 CALC BMI ABV UP PARAM F/U: HCPCS | Performed by: PHYSICIAN ASSISTANT

## 2021-05-25 PROCEDURE — G8432 DEP SCR NOT DOC, RNG: HCPCS | Performed by: PHYSICIAN ASSISTANT

## 2021-05-25 PROCEDURE — 99213 OFFICE O/P EST LOW 20 MIN: CPT | Performed by: PHYSICIAN ASSISTANT

## 2021-05-25 PROCEDURE — G8427 DOCREV CUR MEDS BY ELIG CLIN: HCPCS | Performed by: PHYSICIAN ASSISTANT

## 2021-05-25 RX ORDER — DICLOFENAC SODIUM 75 MG/1
75 TABLET, DELAYED RELEASE ORAL 2 TIMES DAILY WITH MEALS
Qty: 60 TABLET | Refills: 1 | Status: SHIPPED | OUTPATIENT
Start: 2021-05-25

## 2021-05-25 NOTE — PROGRESS NOTES
Patient: Thomas James                MRN: [id number]       SSN: [id number]  YOB: 1990        AGE: 33 y.o. SEX: male          PCP: Lexi Kelly MD  05/25/21    Chief Complaint   Patient presents with    Knee Pain     left       HISTORY:  Thomas James is a 33 y.o. male who returns the office complaining of acute on chronic left knee pain. He was seen under the care of Dr. Cherelle Hutchison back in November 2020 and a review of MRI as below:    IMAGING: MRI left knee 11/18/2020  IMPRESSION:   1. Cluster of findings suggest patellar impingement, to include mild distal  quadriceps and proximal patellar insertional tendinosis, and edema throughout  the suprapatellar fat pad. No morphology to predispose patellar maltracking. 2. No additional findings of internal derangement otherwise throughout the left  Knee. The patient had outpatient physical therapy with only modest improvement in his left knee pain. He has seen orthopedics unassociated with Wesley Garay and has received a cortisone injection a little over 3 months ago to the left knee which also did not help his symptoms. He is limited in his abilities secondary to knee pain to only stand for short periods of time, and walk short distances. He does utilize Motrin and diclofenac gel as needed which helps somewhat for symptom management. Pain at rest associated with his left knee 3-4 on a 10 point scale with a dull achy characteristic. Pain with activity aforementioned including short climbing ascending stairs as well as sitting from a standing position and standing from a sitting position pain can does increase to upwards of a 6-7 on a 10 point scale. Patient is currently wearing a neoprene range of motion brace with stays middle medial and lateral.  He says the brace does give him some support and he feels weak without it on.     Pain Assessment  5/25/2021   Location of Pain Knee   Location Modifiers Left   Severity of Pain 2   Quality of Pain Throbbing; Sharp;Dull;Aching   Quality of Pain Comment -   Duration of Pain Persistent   Frequency of Pain Constant   Aggravating Factors Bending;Stretching;Straightening;Exercise;Kneeling;Squatting;Standing;Stairs   Limiting Behavior Yes   Relieving Factors Rest   Relieving Factors Comment -   Result of Injury No           No results found for: HBA1C, TUA7AHMK, [id number]  Weight Metrics 5/25/2021 11/23/2020 10/12/2020 9/14/2020 6/22/2020 5/28/2020   Weight 166 lb 170 lb 177 lb 9.6 oz 177 lb 169 lb 187 lb   BMI 26.79 kg/m2 27.44 kg/m2 28.67 kg/m2 28.57 kg/m2 27.28 kg/m2 30.18 kg/m2            Problem List Items Addressed This Visit     None      Visit Diagnoses     Chronic pain of left knee    -  Primary    Relevant Orders    AMB POC X-RAY KNEE 3 VIEW (Completed)    Impingement syndrome involving patellar fat pad of left knee              PAST MEDICAL HISTORY:   Past Medical History:   Diagnosis Date    Autism     Depression        PAST SURGICAL HISTORY: History reviewed. No pertinent surgical history. ALLERGIES:   Allergies   Allergen Reactions    Aspirin Hives        CURRENT MEDICATIONS:  A list of medications prior to the time of admission include:  Prior to Admission medications    Medication Sig Start Date End Date Taking? Authorizing Provider   diclofenac EC (VOLTAREN) 75 mg EC tablet Take 1 Tablet by mouth two (2) times daily (with meals). 5/25/21  Yes Nidia Azul PA-C   diclofenac (VOLTAREN) 1 % gel Apply 4 g to affected area every six (6) hours as needed for Pain (left knee). 10/12/20  Yes Render Thee, DO   mirtazapine (Remeron) 45 mg tablet Take 45 mg by mouth nightly.    Yes Provider, Historical   dextroamphetamine-amphetamine (ADDERALL) 5 mg tablet take 1 tablet by mouth every morning for 7 days then 2 tablets every morning for 16 DAYS  Patient not taking: Reported on 5/25/2021 8/31/20   Provider, Historical   naproxen (NAPROSYN) 500 mg tablet Take 500 mg by mouth two (2) times daily (with meals). Patient not taking: Reported on 5/25/2021    Provider, Historical   meloxicam (MOBIC) 15 mg tablet Take 1 tab daily as needed pain with food. Patient not taking: Reported on 5/25/2021 5/28/20   Bjorn Meckel, DO       FAMILY HISTORY: History reviewed. No pertinent family history. SOCIAL HISTORY:   Social History     Socioeconomic History    Marital status: SINGLE     Spouse name: Not on file    Number of children: Not on file    Years of education: Not on file    Highest education level: Not on file   Tobacco Use    Smoking status: Never Smoker    Smokeless tobacco: Never Used   Substance and Sexual Activity    Alcohol use: Not Currently    Drug use: Not Currently     Social Determinants of Health     Financial Resource Strain:     Difficulty of Paying Living Expenses:    Food Insecurity:     Worried About Running Out of Food in the Last Year:     [address] in the Last Year:    Transportation Needs:     Lack of Transportation (Medical):  Lack of Transportation (Non-Medical):    Physical Activity:     Days of Exercise per Week:     Minutes of Exercise per Session:    Stress:     Feeling of Stress :    Social Connections:     Frequency of Communication with Friends and Family:     Frequency of Social Gatherings with Friends and Family:     Attends Synagogue Services:     Active Member of Clubs or Organizations:     Attends Club or Organization Meetings:     Marital Status:        ROS:No CP, No SOB, No fever/chills nor night sweats. No headaches, vision abnormalities to include double and oral loss of vision. No hearing abnormalities. Musculoskeletal pain per HPI. Pain is exacerbated positionally. Pt denies h/o spinal surgery, injections, or PT/chiropractor. Self treated with less than adequate relief on oral antiinflammatories. . Pt denies change in bowel or bladder habits.  Pt denies fever, weight loss, or skin changes. PHYSICAL EXAM:    Visit Vitals  Pulse 91   Temp 97.5 °F (36.4 °C)   Resp 15   Ht 5' 6\" (1.676 m)   Wt 166 lb (75.3 kg)   SpO2 100%   BMI 26.79 kg/m²       Constitutional: Appears well-developed and well-nourished. No distress. Sitting comfortably in the exam room, interacting with conversation with pleasant affect. Skin: Skin over the head, neck, bilateral limbs, and trunk is warm and dry. No rash or erythema noted. Cranial Nerves II-XII grossly intact  HENT: NC/AT. Normal symmetry, bulk and tone of facial and neck musculature. Trachea midline. No discernible thyromegaly or masses. No involuntary movements. Lymphatic: No preauricular, submandibuar, anterior or posterior cervical lymphadenopathy. Psychiatric: The patient is awake, alert, and oriented to person, place and time. Behavior is normal. Thought content normal.   Cardiovascular: No clubbing, cyanosis. No edema bilateral lower extremities. Pulmonary: No tripoding nor accessory muscle recruitment. Breathing normally, no distress, no audible wheezing. Distal cap refill intact at 2/2 Jb UE / LE. Neuro intact Jb UE/LE to noxious stimuli        Ortho Specific exam:    Left knee reveals no warmth, erythema, edema, ecchymosis, or effusion. While supine he has 110 degrees of flexion without discomfort and he hyperextends +10 degrees and recurvatum. MCL and LCL intact with no laxity. Medial stressing does however cause medial joint line discomfort. ACL and PCL intact. Patella tracks midline with crepitation. No popliteal tenderness or evidence of DVT left lower extremity. Quad hamstring strength 5-/5 against resistance left lower extremity. Quad tendon patella tendon insertions intact with minimal tenderness no defects palpable. X-ray: [facility] 5/25/2021 space 3 view of the left knee reveals tricompartmental early osteoarthritis greatest patellofemoral and medial joint space.   No soft tissue calcifications identified. IMPRESSION:      ICD-10-CM ICD-9-CM    1. Chronic pain of left knee  M25.562 719.46 AMB POC X-RAY KNEE 3 VIEW    G89.29 338.29    2. Impingement syndrome involving patellar fat pad of left knee  M25.862 719.86    3. Prior tendinosis as defined on MRI. PLAN: Today we discussed alternatives to care to include but not limited to continuation of conservative management with return to physical therapy for his impingement syndrome involving the patellar fat pad and augmenting care with a cortisone injection today. He declined on the cortisone injection instead he will discontinue using Motrin and replaced with Voltaren enteric-coated tablet 75 mg twice daily. His prescription was sent to the pharmacy on file. He may continue topical Voltaren gel to the knee up to 3 times a day 2 g. We will plan on seeing him back in 1 month or sooner should he fail to improve or worsen with his current symptoms. Today x-rays reviewed copies provided all of his questions answered to his satisfaction. Additionally today we discussed the diagnosis of obesity and the importance of weight management for both cardiovascular health. The patient was recommended to decrease carbohydrate and sugar intake. Patient recommended a formal dietary consult which they will consider and return a call to our office. In light of the patient's osteoarthritic findings I am making a recommendation for aerobic exercise to include but not limited to stationary bicycle, elliptical, therapeutic walking with good shoes and or swimming. Patient should avoid any running or jumping. If using the treadmill then recommendation for no elevation and no running or jogging. Walking is improved. Patient provided a reminder for a \"due or due soon\" health maintenance.  I have asked the patient to schedule an appointment with their primary care provider for follow-up on general health maintenance concerns. Today all the patient's questions were answered to their satisfaction. Copies of x-rays reviewed if obtained this visit, and provided to patient. Dictation disclaimer:  Please note that this dictation was completed with Balaya, the computer voice recognition software. Quite often unanticipated grammatical, syntax, homophones, and other interpretive errors are inadvertently transcribed by the computer software. Please disregard these errors. Please excuse any errors that have escaped final proofreading. Iliana SHAW, APC, MPAS, PA-C  [facility]

## 2021-06-09 ENCOUNTER — HOSPITAL ENCOUNTER (OUTPATIENT)
Dept: PHYSICAL THERAPY | Age: 31
Discharge: HOME | End: 2021-06-09
Attending: PHYSICIAN ASSISTANT
Payer: MEDICARE

## 2021-06-09 DIAGNOSIS — M25.862 IMPINGEMENT SYNDROME INVOLVING PATELLAR FAT PAD OF LEFT KNEE: ICD-10-CM

## 2021-06-09 DIAGNOSIS — G89.29 CHRONIC PAIN OF LEFT KNEE: ICD-10-CM

## 2021-06-09 DIAGNOSIS — M25.562 CHRONIC PAIN OF LEFT KNEE: ICD-10-CM

## 2021-06-09 PROCEDURE — 97014 ELECTRIC STIMULATION THERAPY: CPT

## 2021-06-09 PROCEDURE — 97535 SELF CARE MNGMENT TRAINING: CPT

## 2021-06-09 PROCEDURE — 97162 PT EVAL MOD COMPLEX 30 MIN: CPT

## 2021-06-09 NOTE — PROGRESS NOTES
[facility] PHYSICAL THERAPY AT [address] [place], [place] [address]  Phone: [phone] Fax: [phone] / [address] [address] PHYSICAL THERAPY SERVICES  Patient Name: Darryl Thomas : 1990   Medical   Diagnosis: Chronic pain of left knee [M25.562, G89.29]  Impingement syndrome involving patellar fat pad of left knee [M25.862] Treatment Diagnosis: L knee central sensitization    Onset Date: 10+ years ago      Referral Source: [facility] [place] 2021   Prior Hospitalization: See medical history Provider #: [id number]   Prior Level of Function: Functional Ind limited by pain/ fear avoidance     Comorbidities: Depression    Medications: Verified on Patient Summary List   The Plan of Care and following information is based on the information from the initial evaluation.   ========================================================================  Assessment / key information:  Pt is a 33y.o. year old male who presents with  acute on chronic L knee pain  Starting 10+ years ago after a car door closed on the back of the leg. Patient has been seen in the past for same condition with minimal improvements. Recent injections approx 3+ months ago with min relief for 2-3 months. Surgery is not indicated at this time. Pain ranges from 2-10/10 and managed with medication, pain creams / icy hot, ibuprofen. Patient denies compliance with past HEP programs. IMAGING: MRI left knee 2020 IMPRESSION:1. Cluster of findings suggest patellar impingement, to include mild distalquadriceps and proximal patellar insertional tendinosis, and edema throughoutthe suprapatellar fat pad. No morphology to predispose patellar maltracking. 2. No additional findings of internal derangement otherwise throughout the left Knee.   DEFICITS: walking tolerance with knee brace: \"pushes through as needed\" without brace 5 min, carrying items, stairs - step to pattern  Posture: WFL  Gait:  WFL - no gross deviations noted     ROM :  Hip WFL   Knee  0-129 with pain at both end ranges   Ankle WFL     Strength :  Hip:  Grossly 5- with pain eliciting   Knee: flexion 5-/5, extension 5-/5 - pain limiting , quad lag : negative   Core: bridge : 50% - \"whole leg pain\",    Flexibility:   Hamstrings:  90/90 HS test negative   Quadriceps: Fauzia Test negative   Palpation: sensitive to light touch by the patient   Patella: WNL    Girth Measurements: 2 cm atrophy of L knee sec compensatory  Non use   Balance:  R 8 sec, L 11 sec with pain   30 sec sit to stand : 11 - pain and numbness at rep 5    Home exercise program initiated on initial evaluation to address these deficits.  Pt would benefit from PT to address these deficits for increased functional mobility and QOL.  ========================================================================  Eval Complexity: History: HIGH Complexity :3+ comorbidities / personal factors will impact the outcome/ POC Exam:HIGH Complexity : 4+ Standardized tests and measures addressing body structure, function, activity limitation and / or participation in recreation  Presentation: HIGH Complexity : Unstable and unpredictable characteristics  Clinical Decision Making:MEDIUM Complexity : FOTO score of 26-74Overall Complexity:MEDIUM  Problem List: pain affecting function, decrease ROM, decrease strength, impaired gait/ balance, decrease ADL/ functional abilitiies, decrease activity tolerance, decrease flexibility/ joint mobility and decrease transfer abilities   Treatment Plan may include any combination of the following: Therapeutic exercise, Therapeutic activities, Neuromuscular re-education, Physical agent/modality, Gait/balance training, Manual therapy, Aquatic therapy, Patient education, Self Care training, Functional mobility training, Home safety training and Stair training  Patient / Family readiness to learn indicated by: asking questions, trying to perform skills and interest  Persons(s) to be included in education: patient (P)  Barriers to Learning/Limitations: None  Measures taken:    Patient Goal (s): \"progress so the pain doesn't get worse \"   Patient self reported health status: fair  Rehabilitation Potential: fair   Short Term Goals: To be accomplished in  1  weeks:  1. Pt will be independent and compliant with HEP  Status at last assessment :HEP est at initial eval and will be progress as needed.  Long Term Goals: To be accomplished in  4-10  treatments:  1. Patient will increase FOTO score to 58/100 for indications of increased functional mobility. Status at last assessment : 30/100   2. Patient will demo SLS 15 sec B for improved stability for ADLs   Status at last assessment : 11 sec L , 8 sec R   3. Patient will demo step over step stair negotiation for ease with household mobiliy   Status at last assessment :step to pattern   4. Sanford Chambers will report pain 8/10 at worst for progression to increased activity tolerance with decreased fear avoidance   Status at last assessment :10/10  Frequency / Duration:   Patient to be seen  1-2  times per week for 4-10  treatments:  Patient / Caregiver education and instruction: self care, activity modification and exercises  Therapist Signature: Roman Serna PT Date: 5/1/4166   Certification Period: 6/9/2021-9/7/2021 Time: 7621N   ========================================================================  I certify that the above Physical Therapy Services are being furnished while the patient is under my care. I agree with the treatment plan and certify that this therapy is necessary. Physician Signature:        Date:       Time:                                         Danny Weber PA-C  Please sign and return to [facility] [place] or you may fax the signed copy to [phone]. Thank you.

## 2021-06-09 NOTE — PROGRESS NOTES
PT KNEE / HIP EVAL AND TREATMENT    Patient Name: Liat Lucia  Date:2021  : 1990  [x]  Patient  Verified  Payor: BLUE CROSS MEDICARE / Plan: [facility] [address] [place]  HMO / Product Type: Managed Care Medicare /    In time:1104  Out time:1144  Total Treatment Time (min): 40  Total Timed Codes (min): 8  1:1 Treatment Time ( only): 25   Visit #: 1 of 8-10    Treatment Area: Chronic pain of left knee [M25.562, G89.29]  Impingement syndrome involving patellar fat pad of left knee [M25.862]    SUBJECTIVE  Pain Level (0-10 scale): (C): 7  (B):2  (W): 10   Any medication changes, allergies to medications, diagnosis change, or new procedure performed: see summary sheet for update  Subjective functional status/changes:  CHIEF COMPLAINT: Patient presents with acute on chronic L knee pain  Starting 10+ years ago after a car door closed on the back of the leg. Patient has been seen in the past for same condition with minimal improvements. Recent injections approx 3+ months ago with min relief for 2-3 months. Surgery is not indicated at this time. Pain ranges from 2-10/10 and managed with medication, pain creams / icy hot, ibuprofen. Patient denies compliance with past HEP programs. IMAGING: MRI left knee 2020 IMPRESSION:1. Cluster of findings suggest patellar impingement, to include mild distalquadriceps and proximal patellar insertional tendinosis, and edema throughoutthe suprapatellar fat pad. No morphology to predispose patellar maltracking. 2. No additional findings of internal derangement otherwise throughout the left Knee.   DEFICITS: walking tolerance with knee brace: \"pushes through as needed\" without brace 5 min, carrying items, stairs - step to pattern    OBJECTIVE  Posture: WFL  Gait:  WFL - no gross deviations noted     ROM :  Hip WFL   Knee  0-129 with pain at both end ranges   Ankle WFL     Strength :  Hip:  Grossly 5- with pain eliciting   Knee: flexion 5-/5, extension 5-/5 - pain limiting , quad lag : negative   Core: bridge : 50% - \"whole leg pain\",    Flexibility:   Hamstrings:  90/90 HS test negative   Quadriceps: Fauzia Test negative   Palpation: sensitive to light touch by the patient   Patella:  WNL    Girth Measurements: 2 cm atrophy of L knee sec compensatory  Non use   Balance:  R 8 sec, L 11 sec with pain   30 sec sit to stand : 11 - pain and numbness at rep 5        Patient Education/ Therapeutic Exercise : [x] Discussed POT including PT expectation, desensitization massage , PNE  (minutes) : 8    Modality (rationale): decrease pain   [x]  E-Stim: type epps stimulation - 15 min - one channel      Pain Level (0-10 scale) post treatment: 7  ASSESSMENT  [x]  See Plan of Care    PLAN  [x]  Upgrade activities as tolerated     [x] Other:_ POC  2-3 x 8-10    Haven Brownlee, PT 6/9/2021     Justification for Eval Code Complexity:  Patient History : chronicity  Examination see exam   Clinical Presentation: unstable - multiple PT treatments with min success in the past   Clinical Decision Making : FOTO : 38 /100

## 2021-06-18 ENCOUNTER — HOSPITAL ENCOUNTER (OUTPATIENT)
Dept: PHYSICAL THERAPY | Age: 31
Discharge: HOME | End: 2021-06-18
Attending: PHYSICIAN ASSISTANT
Payer: MEDICARE

## 2021-06-18 PROCEDURE — 97110 THERAPEUTIC EXERCISES: CPT

## 2021-06-18 PROCEDURE — 97140 MANUAL THERAPY 1/> REGIONS: CPT

## 2021-06-18 PROCEDURE — 97014 ELECTRIC STIMULATION THERAPY: CPT

## 2021-06-18 NOTE — PROGRESS NOTES
PT DAILY TREATMENT NOTE     Patient Name: Lonnell Soulier  Date:2021  : 1990  [x]  Patient  Verified  Payor: BLUE CROSS MEDICARE / Plan: [facility] [place]  HMO / Product Type: Managed Care Medicare /    In time: 8:00 am   Out time: 8:38 am  Total Treatment Time (min): 38  Total Timed Codes (min): 38  1:1 Treatment Time (min): 28  Visit #: 2 of 8-10    Treatment Area: Chronic pain of left knee [M25.562, G89.29]    SUBJECTIVE  Pain Level (0-10 scale): 0 at rest  Any medication changes, allergies to medications, adverse drug reactions, diagnosis change, or new procedure performed?: [x] No    [] Yes (see summary sheet for update)  Subjective functional status/changes:   [] No changes reported  Patient states he watched the PNE video and attempted his HEP, although with increased pain afterwards. He states he has been using a compression stocking which has helped manage his symptoms.     OBJECTIVE  Modality rationale: decrease pain to improve the patients ability to improve sitting comfort and tolerance     Min Type Additional Details   10 [x] Estim: []Att   [x]Unatt                  [x]IFC  []Premod                                            []NMES    []Other:  [x]w/ice   []w/heat  Position: reclined with rolled towel under the left knee  Location: (L) knee, anterior    []  Traction: [] Cervical       [] Lumbar                       [] Supine        [] Prone                       []Intermittent   []Continuous Lbs:  [] before manual  [] after manual    []  Ultrasound: []Continuous   [] Pulsed                           []1MHz   []3MHz Location:  W/cm2:    []  Iontophoresis with dexamethasone         Location: [] Take home patch   [] In clinic    []  Ice     []  heat  []  Ice massage Position:  Location:    []  Vasopneumatic Device Pressure: [] lo [] med [] hi   Temp: [] lo [] med [] hi   [x] Skin assessment post-treatment:  [x]intact    []redness- no adverse reaction []redness - adverse reaction:     10 min Therapeutic Exercise:  [x] See flow sheet: initiated per IE   Rationale: increase ROM, increase strength and improve coordination to improve the patients ability to perform ADLs     10 min Therapeutic Activity:  [x]  See flow sheet: initiated per IE   Rationale: improve coordination, improve balance and increase proprioception  to improve the patients ability to squat, negotiate stairs    8 min Manual Therapy:  desensitization to the (L) knee f/b 6-point discrimination with points at least 10 cm apart   Rationale: decrease pain to improve the patients ability to perform ADLs. The manual therapy interventions were performed at a separate and distinct time from the therapeutic activities interventions. with TE min Patient Education: [x] Review HEP     Other Objective/Functional Measures:   Two-point discrimination: 10 cm apart    Pain Level (0-10 scale) post treatment: 0    ASSESSMENT/Changes in Function:   Good tolerance to treatment today with patient req 100% verbal/tactile cueing and demo for proper form/technique with all newly introduced therex. Patient demos sufficient quad and hip strength to perform crate lifts with fair form and negotiate stairs (I) without UE assistance and step-over-step pattern without signs of quadriceps collapse. Limited two points discrimination at the inferior patellar region to approx 10 cm apart    Patient will continue to benefit from skilled PT services to modify and progress therapeutic interventions, address functional mobility deficits, address ROM deficits, address strength deficits, analyze and address soft tissue restrictions, analyze and cue movement patterns, analyze and modify body mechanics/ergonomics and assess and modify postural abnormalities to attain remaining goals.      [x]  See Plan of Care  []  See progress note/recertification  []  See Discharge Summary         Progress towards goals / Updated goals: · Short Term Goals: To be accomplished in  1  weeks:  1. Patient will be independent and compliant with HEP  Status at last assessment: HEP est at initial eval and will be progress as needed. Current: goal progressing; pt notes compliance for one session only, discussed with patient proper intensity of the desensitization including simulation of proper procedure (6/18/21)  · Long Term Goals: To be accomplished in  4-10  treatments:  1. Patient will increase FOTO score to 58/100 for indications of increased functional mobility. Status at last assessment: 30/100   2. Patient will demo SLS 15 sec B for improved stability for ADLs   Status at last assessment: 11 sec L , 8 sec R   3. Patient will demo step over step stair negotiation for ease with household mobiliy   Status at last assessment: step to pattern   4.   Paitent will report pain 8/10 at worst for progression to increased activity tolerance with decreased fear avoidance   Status at last assessment: 10/10    PLAN  [x]  Upgrade activities as tolerated     [x]  Continue plan of care  []  Update interventions per flow sheet       []  Discharge due to:_  [x]  Other: assess response to treatment     Dillon Jain, PTA 6/18/2021

## 2021-06-23 ENCOUNTER — HOSPITAL ENCOUNTER (OUTPATIENT)
Dept: PHYSICAL THERAPY | Age: 31
Discharge: HOME | End: 2021-06-23
Attending: PHYSICIAN ASSISTANT
Payer: MEDICARE

## 2021-06-23 PROCEDURE — 97530 THERAPEUTIC ACTIVITIES: CPT

## 2021-06-23 PROCEDURE — 97110 THERAPEUTIC EXERCISES: CPT

## 2021-06-23 PROCEDURE — 97140 MANUAL THERAPY 1/> REGIONS: CPT

## 2021-06-23 NOTE — PROGRESS NOTES
PT DAILY TREATMENT NOTE     Patient Name: Liat Lucia  Date:2021  : 1990  [x]  Patient  Verified  Payor: BLUE CROSS MEDICARE / Plan: [facility] [place]  HMO / Product Type: Managed Care Medicare /    In time: 2:00 pm            Out time: 2:38 pm  Total Treatment Time (min): 38  Total Timed Codes (min): 38  1:1 Treatment Time (min): 38  Visit #: 3 of 8-10    Treatment Area: Chronic pain of left knee [M25.562, G89.29]    SUBJECTIVE  Pain Level (0-10 scale): 1 at rest  Any medication changes, allergies to medications, adverse drug reactions, diagnosis change, or new procedure performed?: [x] No    [] Yes (see summary sheet for update)  Subjective functional status/changes:   [] No changes reported  Patient reports minimal change to pain levels despite performing his HEP 2x daily.     OBJECTIVE  Modality rationale: decrease pain to improve the patients ability to improve sitting comfort and tolerance     Min Type Additional Details   NT [] Estim: []Att   []Unatt                  []IFC  []Premod                                            []NMES    []Other:  []w/ice   []w/heat  Position:   Location:    []  Traction: [] Cervical       [] Lumbar                       [] Supine        [] Prone                       []Intermittent   []Continuous Lbs:  [] before manual  [] after manual    []  Ultrasound: []Continuous   [] Pulsed                           []1MHz   []3MHz Location:  W/cm2:    []  Iontophoresis with dexamethasone         Location: [] Take home patch   [] In clinic    []  Ice     []  heat  []  Ice massage Position:  Location:    []  Vasopneumatic Device Pressure: [] lo [] med [] hi   Temp: [] lo [] med [] hi   [x] Skin assessment post-treatment:  [x]intact    []redness- no adverse reaction                                                                                 []redness - adverse reaction:     12 min Therapeutic Exercise:  [x] See flow sheet:   Rationale: increase ROM, increase strength and improve coordination to improve the patients ability to perform ADLs     14 min Therapeutic Activity:  [x]  See flow sheet: added DILLON shin heel slides for LE dressing   Rationale: improve coordination, improve balance and increase proprioception  to improve the patients ability to squat, negotiate stairs    12 min Manual Therapy:  desensitization to the (L) knee f/b 6-point discrimination with points at least 5 cm apart, differences between soft caress, broad deep pressure, narrow deep pressure   Rationale: decrease pain to improve the patients ability to perform ADLs. The manual therapy interventions were performed at a separate and distinct time from the therapeutic activities interventions. with TE min Patient Education: [x] Review HEP - add DILLON heel slides (verbally)     Other Objective/Functional Measures:   SLS: 30 seconds bilaterally    Pain Level (0-10 scale) post treatment: 1 at rest    ASSESSMENT/Changes in Function:   Patient with objective improvements in overall functional mobility in the clinic. Improved tactile differentiation between light caress and deep pressure, although pt demos difficulty differentiating spatial changes of pressure. Good overall form with SB wall squats, although all exercise remains painful. Patient will continue to benefit from skilled PT services to modify and progress therapeutic interventions, address functional mobility deficits, address ROM deficits, address strength deficits, analyze and address soft tissue restrictions, analyze and cue movement patterns, analyze and modify body mechanics/ergonomics and assess and modify postural abnormalities to attain remaining goals. [x]  See Plan of Care  []  See progress note/recertification  []  See Discharge Summary         Progress towards goals / Updated goals: · Short Term Goals: To be accomplished in  1  weeks:  1.   Patient will be independent and compliant with HEP  Status at last assessment: HEP est at initial eval and will be progress as needed. Current: goal progressing; pt notes compliance for one session only, discussed with patient proper intensity of the desensitization including simulation of proper procedure (6/18/21)  · Long Term Goals: To be accomplished in  4-10  treatments:  1. Patient will increase FOTO score to 58/100 for indications of increased functional mobility. Status at last assessment: 30/100   2. Patient will demo SLS 15 sec B for improved stability for ADLs   Status at last assessment: 11 sec L , 8 sec R  Current: goal met; pt demos 30 sec (B) SLS with mild instability (6/23/21)   3. Patient will demo step over step stair negotiation for ease with household mobiliy   Status at last assessment: step to pattern   Current: goal progressing; pt demos step over step pattern in clinic without UE assistance although notes increased pain (6/23/21)  4.   Paitent will report pain 8/10 at worst for progression to increased activity tolerance with decreased fear avoidance   Status at last assessment: 10/10    PLAN  [x]  Upgrade activities as tolerated     [x]  Continue plan of care  []  Update interventions per flow sheet       []  Discharge due to:_  [x]  Other: assess response to treatment     Dillon Jain, PTA 6/23/2021

## 2021-06-30 ENCOUNTER — HOSPITAL ENCOUNTER (OUTPATIENT)
Dept: PHYSICAL THERAPY | Age: 31
Discharge: HOME | End: 2021-06-30
Attending: PHYSICIAN ASSISTANT
Payer: MEDICARE

## 2021-06-30 PROCEDURE — 97140 MANUAL THERAPY 1/> REGIONS: CPT

## 2021-06-30 PROCEDURE — 97530 THERAPEUTIC ACTIVITIES: CPT

## 2021-06-30 PROCEDURE — 97110 THERAPEUTIC EXERCISES: CPT

## 2021-06-30 NOTE — PROGRESS NOTES
PT DAILY TREATMENT NOTE     Patient Name: Sandra García  Date:2021  : 1990  [x]  Patient  Verified  Payor: BLUE CROSS MEDICARE / Plan: [facility] [place]  HMO / Product Type: Managed Care Medicare /    In time: 2:00 pm             Out time: 2:49 pm  Total Treatment Time (min): 49  Total Timed Codes (min): 39  1:1 Treatment Time (min): 39  Visit #: 4 of 8-10    Treatment Area: Chronic pain of left knee [M25.562, G89.29]    SUBJECTIVE  Pain Level (0-10 scale): 4  Any medication changes, allergies to medications, adverse drug reactions, diagnosis change, or new procedure performed?: [x] No    [] Yes (see summary sheet for update)  Subjective functional status/changes:   [] No changes reported  Patient notes doing ok. He is contemplating returning to the swimming pool.     OBJECTIVE  Modality rationale: decrease pain to improve the patients ability to improve sitting comfort and tolerance     Min Type Additional Details    [] Estim: []Att   []Unatt                  []IFC  []Premod                                            []NMES    []Other:  []w/ice   []w/heat  Position:   Location:    []  Traction: [] Cervical       [] Lumbar                       [] Supine        [] Prone                       []Intermittent   []Continuous Lbs:  [] before manual  [] after manual    []  Ultrasound: []Continuous   [] Pulsed                           []1MHz   []3MHz Location:  W/cm2:    []  Iontophoresis with dexamethasone         Location: [] Take home patch   [] In clinic   10 []  Ice     [x]  heat  []  Ice massage Position: reclined with towel under knee  Location: (L) knee    []  Vasopneumatic Device Pressure: [] lo [] med [] hi   Temp: [] lo [] med [] hi   [x] Skin assessment post-treatment:  [x]intact    []redness- no adverse reaction                                                                                 []redness - adverse reaction:     13 min Therapeutic Exercise:  [x] See flow sheet:   Rationale: increase ROM, increase strength and improve coordination to improve the patients ability to perform ADLs     18 min Therapeutic Activity:  [x]  See flow sheet: initiated TM walking to improve gait quality, added super esperanza, grapevine   Rationale: improve coordination, improve balance and increase proprioception  to improve the patients ability to squat, negotiate stairs    8 min Manual Therapy: k-taping to the (L) knee for desensitization   Rationale: decrease pain to improve the patients ability to perform ADLs. The manual therapy interventions were performed at a separate and distinct time from the therapeutic activities interventions. with TE min Patient Education: [x] Review HEP - OK to swim in swimming pool, which may reduce swelling and improve generally mobility and cardiovascular endurance; super esperanza and grapevine in the pool     Other Objective/Functional Measures:   SLS on foam: (L) 30 seconds, (R) 30 seconds with normal sway  Worst pain: 5-6/10    Pain Level (0-10 scale) post treatment: 0 numb    ASSESSMENT/Changes in Function:   Patient able to perform all tasks in clinic with good overall form, albeit with increased reported pain which resolved to baseline after rest indicative of chronic pain. Full (L) knee AROM with good extension. Patient will continue to benefit from skilled PT services to modify and progress therapeutic interventions, address functional mobility deficits, address ROM deficits, address strength deficits, analyze and address soft tissue restrictions, analyze and cue movement patterns, analyze and modify body mechanics/ergonomics and assess and modify postural abnormalities to attain remaining goals. [x]  See Plan of Care  []  See progress note/recertification  []  See Discharge Summary         Progress towards goals / Updated goals: · Short Term Goals: To be accomplished in  1  weeks:  1.   Patient will be independent and compliant with HEP  Status at last assessment: HEP est at initial eval and will be progress as needed. Current: goal progressing; pt notes compliance for one session only, discussed with patient proper intensity of the desensitization including simulation of proper procedure (6/18/21)  · Long Term Goals: To be accomplished in  4-10  treatments:  1. Patient will increase FOTO score to 58/100 for indications of increased functional mobility. Status at last assessment: 30/100   2. Patient will demo SLS 15 sec B for improved stability for ADLs   Status at last assessment: 11 sec L , 8 sec R  Current: goal met; pt demos 30 sec (B) SLS with mild instability (6/23/21)   3. Patient will demo step over step stair negotiation for ease with household mobiliy   Status at last assessment: step to pattern   Current: goal progressing; pt demos step over step pattern in clinic without UE assistance although notes increased pain (6/23/21)   4.   Paitent will report pain 8/10 at worst for progression to increased activity tolerance with decreased fear avoidance   Status at last assessment: 10/10   Current: goal progressing; pt notes 5-6/10 worst pain (6/30/21)    PLAN  [x]  Upgrade activities as tolerated     [x]  Continue plan of care  []  Update interventions per flow sheet       []  Discharge due to:_  []  Other:_    Kalin Meeks, PTA 6/30/2021

## 2021-07-15 ENCOUNTER — APPOINTMENT (OUTPATIENT)
Dept: PHYSICAL THERAPY | Age: 31
End: 2021-07-15
Attending: PHYSICIAN ASSISTANT
Payer: MEDICARE

## 2021-07-29 ENCOUNTER — HOSPITAL ENCOUNTER (OUTPATIENT)
Dept: PHYSICAL THERAPY | Age: 31
Discharge: HOME | End: 2021-07-29
Attending: PHYSICIAN ASSISTANT
Payer: MEDICARE

## 2021-07-29 PROCEDURE — 97140 MANUAL THERAPY 1/> REGIONS: CPT

## 2021-07-29 PROCEDURE — 97014 ELECTRIC STIMULATION THERAPY: CPT

## 2021-07-29 PROCEDURE — 97110 THERAPEUTIC EXERCISES: CPT

## 2021-07-29 NOTE — PROGRESS NOTES
PT DAILY TREATMENT NOTE     Patient Name: Daylene Landau  Date:2021  : 1990  [x]  Patient  Verified  Payor: BLUE CROSS MEDICARE / Plan: [facility] HMO / Product Type: Managed Care Medicare /    In time: 2:45 pm           Out time: 3:41 pm  Total Treatment Time (min): 56  Total Timed Codes (min): 46  1:1 Treatment Time (min): 30  Visit #: 5 of 8-10    Treatment Area: Chronic pain of left knee [M25.562, G89.29]    SUBJECTIVE  Pain Level (0-10 scale): 4  Any medication changes, allergies to medications, adverse drug reactions, diagnosis change, or new procedure performed?: [x] No    [] Yes (see summary sheet for update)  Subjective functional status/changes:   [] No changes reported  Patient notes he was improving, but slacked off, and is now doing worse.     OBJECTIVE  Modality rationale: decrease pain to improve the patients ability to improve sitting comfort and tolerance     Min Type Additional Details   10 [x] Estim: []Att   [x]Unatt                  [x]IFC  []Premod                                            []NMES    []Other:  [x]w/ice   []w/heat  Position: supine with LEs elevated by wedge  Location: (L) knee    []  Traction: [] Cervical       [] Lumbar                       [] Supine        [] Prone                       []Intermittent   []Continuous Lbs:  [] before manual  [] after manual    []  Ultrasound: []Continuous   [] Pulsed                           []1MHz   []3MHz Location:  W/cm2:    []  Iontophoresis with dexamethasone         Location: [] Take home patch   [] In clinic    []  Ice     []  heat  []  Ice massage Position: reclined with towel under knee  Location: (L) knee    []  Vasopneumatic Device Pressure: [] lo [] med [] hi   Temp: [] lo [] med [] hi   [x] Skin assessment post-treatment:  [x]intact    []redness- no adverse reaction                                                                                 []redness - adverse reaction:     19 min Therapeutic Exercise:  [x] See flow sheet: assisted pt with FOTO completion   Rationale: increase ROM, increase strength and improve coordination to improve the patients ability to perform ADLs     19 min Therapeutic Activity:  [x]  See flow sheet:    Rationale: improve coordination, improve balance and increase proprioception  to improve the patients ability to squat, negotiate stairs    8 min Manual Therapy: massage gun STM to (L) quad, first static then dynamic to improve comfort with rhythmic percussion and pressure   Rationale: decrease pain to improve the patients ability to perform ADLs. The manual therapy interventions were performed at a separate and distinct time from the therapeutic activities interventions. with TE min Patient Education: [x] Review HEP for D/C; discussed negative effect of prolonged compression sleeve use on decreasing sensation differentiation     Other Objective/Functional Measures:   SLS on foam: (L) 30 seconds, (R) 30 seconds with normal sway  Worst pain: 5-6/10  FOTO score: 42/100 (at IE, 38/100)  (L) knee AROM: 0-131 deg  (L) LE strength: grossly 5-/5 for the left knee and hip limited by pain    Pain Level (0-10 scale) post treatment: 0    ASSESSMENT/Changes in Function:   See D/C. Patient will continue to benefit from skilled PT services to modify and progress therapeutic interventions, address functional mobility deficits, address ROM deficits, address strength deficits, analyze and address soft tissue restrictions, analyze and cue movement patterns, analyze and modify body mechanics/ergonomics and assess and modify postural abnormalities to attain remaining goals. [x]  See Discharge Summary         Progress towards goals / Updated goals:  1. Patient will increase FOTO score to 58/100 for indications of increased functional mobility. Status at last assessment: 30/100   2.   Patient will demo SLS 15 sec B for improved stability for ADLs   Status at last assessment: 11 sec L , 8 sec R  Current: goal met; pt demos 30 sec (B) SLS with mild instability (6/23/21)   3. Patient will demo step over step stair negotiation for ease with household mobiliy   Status at last assessment: step to pattern   Current: goal progressing; pt demos step over step pattern in clinic without UE assistance although notes increased pain (6/23/21)   4.   Paitent will report pain 8/10 at worst for progression to increased activity tolerance with decreased fear avoidance   Status at last assessment: 10/10   Current: goal progressing; pt notes 5-6/10 worst pain (6/30/21)    PLAN      [x]  Discharge due to: program complete; minimal progress to pain levels, despite patient demo'ing WFL functional mobility for stairs, squatting, kneeling, and ambulation    Luanne Miller, PTA 7/29/2021

## 2021-07-29 NOTE — PROGRESS NOTES
Physical Therapy Discharge Instructions      [facility] - [address] [facility]  [phone] fax      Patient: Annabelle Miner  : 1990      Continue Home Exercise Program 3 times per day for 4 weeks, then decrease to 3 times per week      Continue with    [x] Ice  as needed     [] Heat           Follow up with MD:     [x] Upon completion of therapy     [] As needed      Recommendations:     [x]   Return to activity with home program    []   Return to activity with the following modifications:       []Post Rehab Program     []Join Independent aquatic program     []Return to/join local gym        Charito Ndiaye, NEVIN  2021

## 2021-09-02 ENCOUNTER — TRANSCRIBE ORDER (OUTPATIENT)
Dept: SCHEDULING | Age: 31
End: 2021-09-02

## 2021-09-02 DIAGNOSIS — M25.562 LEFT KNEE PAIN: Primary | ICD-10-CM

## 2021-09-13 NOTE — PROGRESS NOTES
[facility] AT [address] [place] [address]. [address], [facility], [facility]  Phone: [phone] Fax [phone]  DISCHARGE SUMMARY  Patient Name: Mary Jenkins : 1990   Treatment/Medical Diagnosis: Chronic pain of left knee [R99.572, G89.29]   Referral Source: Soham Peter     Date of Initial Visit: 2021 Attended Visits: 5 Missed Visits: 2     SUMMARY OF TREATMENT  Pt is a 33y.o. year old male who presents with  acute on chronic L knee pain  Starting 10+ years ago after a car door closed on the back of the leg. Patient has been seen in the past for same condition with minimal improvements. Treatment included all modalities for pain relief including massage, desensitization, ES, all forms of exercise and HEP progress. CURRENT STATUS  Patient saw min to no progression with treatment again this time around. Patient denies compliance with HEP. Knee is fully functional for ROM, flexbility, strength and mobility however patient reports continued pain. Likely high central sensitization related. Patient will be DC d/t lack of progress. Assessment as follows:   SLS on foam: (L) 30 seconds, (R) 30 seconds with normal sway  Worst pain: 5-6/10  FOTO score: 42/100 (at IE, 38/100)  (L) knee AROM: 0-131 deg  (L) LE strength: grossly 5-/5 for the left knee and hip limited by pain         Progress towards goals / Updated goals:  1.  Patient will increase FOTO score to 58/100 for indications of increased functional mobility.    Status at last assessment: 30/100   2.  Patient will demo SLS 15 sec B for improved stability for ADLs   Status at last assessment: 11 sec L , 8 sec R  Current: goal met; pt demos 30 sec (B) SLS with mild instability (21)   3.  Patient will demo step over step stair negotiation for ease with household mobiliy   Status at last assessment: step to pattern   Current: goal progressing; pt demos step over step pattern in clinic without UE assistance although notes increased pain (6/23/21)   4.  Sisi will report pain 8/10 at worst for progression to increased activity tolerance with decreased fear avoidance   Status at last assessment: 10/10   Current: goal progressing; pt notes 5-6/10 worst pain (6/30/21)     RECOMMENDATIONS  Discontinue therapy due to lack of appreciable progress towards goals. If you have any questions/comments please contact us directly at [phone]. Thank you for allowing us to assist in the care of your patient.   Therapist Signature: Rosalia Fong, PT Date: 9/13/2021   Reporting Period: 6/9/2021-7/29/2021 Time: 12:25 PM

## 2021-10-05 ENCOUNTER — HOSPITAL ENCOUNTER (OUTPATIENT)
Dept: MRI IMAGING | Age: 31
Discharge: HOME | End: 2021-10-05
Attending: GENERAL PRACTICE
Payer: MEDICARE

## 2021-10-05 DIAGNOSIS — M25.562 LEFT KNEE PAIN: ICD-10-CM

## 2021-10-05 PROCEDURE — 73721 MRI JNT OF LWR EXTRE W/O DYE: CPT

## 2021-10-20 ENCOUNTER — OFFICE VISIT (OUTPATIENT)
Dept: ORTHOPEDIC SURGERY | Age: 31
End: 2021-10-20
Payer: MEDICARE

## 2021-10-20 VITALS
HEART RATE: 103 BPM | OXYGEN SATURATION: 99 % | RESPIRATION RATE: 18 BRPM | HEIGHT: 66 IN | WEIGHT: 172.2 LBS | TEMPERATURE: 97.2 F | BODY MASS INDEX: 27.68 KG/M2

## 2021-10-20 DIAGNOSIS — G89.29 CHRONIC PAIN OF LEFT KNEE: Primary | ICD-10-CM

## 2021-10-20 DIAGNOSIS — M25.562 CHRONIC PAIN OF LEFT KNEE: Primary | ICD-10-CM

## 2021-10-20 PROCEDURE — G8432 DEP SCR NOT DOC, RNG: HCPCS | Performed by: PHYSICIAN ASSISTANT

## 2021-10-20 PROCEDURE — G8419 CALC BMI OUT NRM PARAM NOF/U: HCPCS | Performed by: PHYSICIAN ASSISTANT

## 2021-10-20 PROCEDURE — G8427 DOCREV CUR MEDS BY ELIG CLIN: HCPCS | Performed by: PHYSICIAN ASSISTANT

## 2021-10-20 PROCEDURE — 99212 OFFICE O/P EST SF 10 MIN: CPT | Performed by: PHYSICIAN ASSISTANT

## 2021-10-20 NOTE — PROGRESS NOTES
Emmette Koyanagi returns the office for follow-up regarding his left knee. He is currently in physical therapy in the [place] area. He is uncertain of the exact name of the therapy center but believes at [facility]. He is continue to complete 4 sessions since the MRI obtained of the left knee. He is persisted with a dull achy pain over the inner portion of his left knee which limits his ability to only stand for short periods of time and walk short distances. He walks with a limp favoring the left side. MRI as below:    Result Information    Status: Final result (Exam End: 10/5/2021 15:32) Provider Status: Open   Study Result    Narrative & Impression   Multisequence multiplanar MR images of the left knee were obtained.     HISTORY: Chronic pain.     Comparison November 18, 2020     Slightly irregular meniscal root in the posterior medial meniscus with potential  small posterior meniscal cyst. Remaining medial meniscus is unremarkable. Cartilage is intact. Mild edema surrounding the intact medial collateral  ligament.     Marrow edema in the intact ACL. PCL is intact.     Lateral meniscus, lateral compartment cartilage and lateral collateral ligament  complex are intact.     No pathologic joint effusion. Patellar cartilage and retinacula are intact.     Persistent edema in the quadriceps tendon, prepatellar soft tissue and mild  insertional patellar tendinosis. Inflammation of the fat pad posterior to the  quadriceps insertion. Marrow signal is otherwise unremarkable. TT TG distance 16  mm.     IMPRESSION  1. Slightly irregular meniscal root in posterior horn of medial meniscus with  potential adjacent meniscal cyst. Subtle radial tear not excluded. 2. ACL sprain. MCL sprain  3. Quadriceps and patellar tendinosis with soft tissue inflammation, slightly  worse than before. Presumed patellar impingement. Patellar cartilage and  retinacula remain intact.      With the findings consistent of above with a possible subtle radial tear and ACL sprain with MCL sprain I recommended the patient not do any deep knee bending no jumping no running. Patient is to be careful on stairs climbing and descending. Patient will continue physical therapy and follow-up with Dr. Kavitha Wong concerning continuity of care recommendations. Today all patient's questions were answered to their satisfaction. Copy of the MRI reviewed and provided.

## 2021-10-27 ENCOUNTER — OFFICE VISIT (OUTPATIENT)
Dept: ORTHOPEDIC SURGERY | Age: 31
End: 2021-10-27
Payer: MEDICARE

## 2021-10-27 VITALS
HEIGHT: 66 IN | HEART RATE: 80 BPM | TEMPERATURE: 97.1 F | WEIGHT: 173.6 LBS | BODY MASS INDEX: 27.9 KG/M2 | OXYGEN SATURATION: 97 %

## 2021-10-27 DIAGNOSIS — S83.412A SPRAIN OF MEDIAL COLLATERAL LIGAMENT OF LEFT KNEE, INITIAL ENCOUNTER: ICD-10-CM

## 2021-10-27 DIAGNOSIS — S83.512A SPRAIN OF ANTERIOR CRUCIATE LIGAMENT OF LEFT KNEE, INITIAL ENCOUNTER: Primary | ICD-10-CM

## 2021-10-27 PROCEDURE — G8427 DOCREV CUR MEDS BY ELIG CLIN: HCPCS | Performed by: ORTHOPAEDIC SURGERY

## 2021-10-27 PROCEDURE — G8419 CALC BMI OUT NRM PARAM NOF/U: HCPCS | Performed by: ORTHOPAEDIC SURGERY

## 2021-10-27 PROCEDURE — G8432 DEP SCR NOT DOC, RNG: HCPCS | Performed by: ORTHOPAEDIC SURGERY

## 2021-10-27 PROCEDURE — 99213 OFFICE O/P EST LOW 20 MIN: CPT | Performed by: ORTHOPAEDIC SURGERY

## 2021-10-27 NOTE — PROGRESS NOTES
Eduardo Deras  1990   Chief Complaint   Patient presents with    Knee Pain     left, injured 15 years ago and pain is increasing to an unbearable level        HISTORY OF PRESENT ILLNESS  Eduardo Deras is a 33 y.o. male who presents today for evaluation of left knee. He rates his pain 4/10 today. Referred by CARLOS Munoz who ordered a MRI. Pt reports having an injury about 15 years ago and pain has increased since then. Reports a sharp and dull pain. Has relief from the pain with a knee brace or elevation with a pillow. Has been to PT and tried an anti-inflammatory with no relief. Presents today with a compression sleeve on. No previous surgeries. Does not work at the moment. Patient describes the pain as aching, burning, sharp and throbbing that is Constant in nature. Symptoms are worse with Activity and is better with  Rest, Elevation. Associated symptoms include nothing. Since problem started, it: is unchanged. Pain does not wake patient up at night. Has taken no meds for the problem. Has tried following treatments: Injections:NO; Brace:NO;  Therapy:YES; Cane/Crutch:NO       Allergies   Allergen Reactions    Aspirin Hives        Past Medical History:   Diagnosis Date    Autism     Depression     Left knee pain     15 years ago      Social History     Socioeconomic History    Marital status: SINGLE     Spouse name: Not on file    Number of children: Not on file    Years of education: Not on file    Highest education level: Not on file   Occupational History    Not on file   Tobacco Use    Smoking status: Never Smoker    Smokeless tobacco: Never Used   Substance and Sexual Activity    Alcohol use: Not Currently    Drug use: Not Currently    Sexual activity: Not on file   Other Topics Concern    Not on file   Social History Narrative    Not on file     Social Determinants of Health     Financial Resource Strain:     Difficulty of Paying Living Expenses:    Food Insecurity:     Worried About [facility] in the Last Year:    [address] in the Last Year:    Transportation Needs:     Lack of Transportation (Medical):  Lack of Transportation (Non-Medical):    Physical Activity:     Days of Exercise per Week:     Minutes of Exercise per Session:    Stress:     Feeling of Stress :    Social Connections:     Frequency of Communication with Friends and Family:     Frequency of Social Gatherings with Friends and Family:     Attends Spiritism Services:     Active Member of Clubs or Organizations:     Attends Club or Organization Meetings:     Marital Status:    Intimate Partner Violence:     Fear of Current or Ex-Partner:     Emotionally Abused:     Physically Abused:     Sexually Abused:       History reviewed. No pertinent surgical history. History reviewed. No pertinent family history. Current Outpatient Medications   Medication Sig    diclofenac EC (VOLTAREN) 75 mg EC tablet Take 1 Tablet by mouth two (2) times daily (with meals).  diclofenac (VOLTAREN) 1 % gel Apply 4 g to affected area every six (6) hours as needed for Pain (left knee).  mirtazapine (Remeron) 45 mg tablet Take 45 mg by mouth nightly.  dextroamphetamine-amphetamine (ADDERALL) 5 mg tablet take 1 tablet by mouth every morning for 7 days then 2 tablets every morning for 16 DAYS (Patient not taking: Reported on 5/25/2021)    naproxen (NAPROSYN) 500 mg tablet Take 500 mg by mouth two (2) times daily (with meals). (Patient not taking: Reported on 5/25/2021)    meloxicam (MOBIC) 15 mg tablet Take 1 tab daily as needed pain with food. (Patient not taking: Reported on 5/25/2021)     No current facility-administered medications for this visit. REVIEW OF SYSTEM   Patient denies: Weight loss, Fever/Chills, HA, Visual changes, Fatigue, Chest pain, SOB, Abdominal pain, N/V/D/C, Blood in stool or urine, Edema. Pertinent positive as above in HPI.  All others were negative    PHYSICAL EXAM:   Visit Vitals  Pulse 80   Temp 97.1 °F (36.2 °C) (Skin)   Ht 5' 6\" (1.676 m)   Wt 173 lb 9.6 oz (78.7 kg)   SpO2 97%   BMI 28.02 kg/m²     The patient is a well-developed, well-nourished male   in no acute distress. The patient is alert and oriented times three. The patient is alert and oriented times three. Mood and affect are normal.  LYMPHATIC: lymph nodes are not enlarged and are within normal limits  SKIN: normal in color and non tender to palpation. There are no bruises or abrasions noted. NEUROLOGICAL: Motor sensory exam is within normal limits. Reflexes are equal bilaterally. There is normal sensation to pinprick and light touch  MUSCULOSKELETAL:  Examination Left knee   Skin Intact   Range of motion 0-130   Effusion -   Medial joint line tenderness -   Lateral joint line tenderness -   Tenderness Pes Bursa -   Tenderness insertion MCL -   Tenderness insertion LCL -   Dharmeshs -   Patella crepitus -   Patella grind -   Lachman -   Pivot shift -   Anterior drawer -   Posterior drawer -   Varus stress -   Valgus stress -   Neurovascular Intact   Calf Swelling and Tenderness to Palpation -   Steph's Test -   Hamstring Cord Tightness -        IMAGING: MRI of the left knee dated 10/5/2021 was reviewed and read by Dr. Gerber Bolds:   IMPRESSION  1. Slightly irregular meniscal root in posterior horn of medial meniscus with  potential adjacent meniscal cyst. Subtle radial tear not excluded. 2. ACL sprain. MCL sprain  3. Quadriceps and patellar tendinosis with soft tissue inflammation, slightly  worse than before. Presumed patellar impingement. Patellar cartilage and  retinacula remain intact. IMPRESSION:      ICD-10-CM ICD-9-CM    1. Sprain of anterior cruciate ligament of left knee, initial encounter  S83.512A 844.2 REFERRAL TO RHEUMATOLOGY   2. Sprain of medial collateral ligament of left knee, initial encounter  S83.412A 844.1 REFERRAL TO RHEUMATOLOGY        PLAN:   1.  Patient presents today with left knee pain due to a MRI-documented ACL and MCL sprain. Based on the MRI, I do not believe his condition can be treated with surgery and would like him to follow up with a rheumatologist. Return as needed. Risk factors include: n/a  2. No ultrasound exam indicated today  3. No cortisone injection indicated today   4. No Physical/Occupational Therapy indicated today  5. No diagnostic test indicated today:   6. No durable medical equipment indicated today  7. Yes referral indicated today RHEUM OTOLOGY   8. No medications indicated today:   9. No Narcotic indicated today     RTC Follow up with rheumatology       Scribed by Parag Arzate) as dictated by Vipul Solis MD    I, Dr. Vipul Solis, confirm that all documentation is accurate.     Vipul Solis M.D.   [facility]

## 2021-11-04 ENCOUNTER — TELEPHONE (OUTPATIENT)
Dept: ORTHOPEDIC SURGERY | Age: 31
End: 2021-11-04

## 2021-11-04 NOTE — TELEPHONE ENCOUNTER
[facility] called and said that they received the Referral for the patient from [address] [place] [address]. The said that the Diagnosis is not something they treat for. [facility]  Tel. [phone]. Note : Referral is from 10/27/2021.

## 2021-11-09 NOTE — TELEPHONE ENCOUNTER
Patients referral, demographics and office notes were faxed to [facility] tel# [phone] fax# [phone]

## 2021-11-09 NOTE — TELEPHONE ENCOUNTER
I got a fax back from [facility], they do not accept patients insurance. Still looking for a rheumatologist that does.

## 2022-04-21 ENCOUNTER — OFFICE VISIT (OUTPATIENT)
Dept: SURGERY | Age: 32
End: 2022-04-21
Payer: MEDICARE

## 2022-04-21 VITALS
TEMPERATURE: 97.1 F | WEIGHT: 179 LBS | DIASTOLIC BLOOD PRESSURE: 76 MMHG | SYSTOLIC BLOOD PRESSURE: 112 MMHG | BODY MASS INDEX: 28.77 KG/M2 | HEIGHT: 66 IN | OXYGEN SATURATION: 100 % | HEART RATE: 82 BPM

## 2022-04-21 DIAGNOSIS — S60.552A FOREIGN BODY OF LEFT HAND, INITIAL ENCOUNTER: Primary | ICD-10-CM

## 2022-04-21 PROCEDURE — G8510 SCR DEP NEG, NO PLAN REQD: HCPCS | Performed by: SURGERY

## 2022-04-21 PROCEDURE — G8427 DOCREV CUR MEDS BY ELIG CLIN: HCPCS | Performed by: SURGERY

## 2022-04-21 PROCEDURE — G8419 CALC BMI OUT NRM PARAM NOF/U: HCPCS | Performed by: SURGERY

## 2022-04-21 PROCEDURE — 99203 OFFICE O/P NEW LOW 30 MIN: CPT | Performed by: SURGERY

## 2022-04-21 NOTE — PROGRESS NOTES
Román Hansen is a 27 y.o. male (: 1990) presenting to address:    Chief Complaint   Patient presents with    New Patient     Cyst on left thumb and pellete in left hand/ referred by Dr Fanny Page       Medication list and allergies have been reviewed with Román Hansen and updated as of today's date. I have gone over all Medical, Surgical and Social History with Román Hansen and updated/added the information accordingly.
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
## 2023-07-14 ENCOUNTER — HOSPITAL ENCOUNTER (OUTPATIENT)
Facility: HOSPITAL | Age: 33
Setting detail: RECURRING SERIES
Discharge: HOME | End: 2023-07-17
Payer: MEDICARE

## 2023-07-14 PROCEDURE — 97162 PT EVAL MOD COMPLEX 30 MIN: CPT | Performed by: PHYSICAL THERAPIST

## 2023-07-14 PROCEDURE — 97535 SELF CARE MNGMENT TRAINING: CPT | Performed by: PHYSICAL THERAPIST

## 2023-07-19 ENCOUNTER — APPOINTMENT (OUTPATIENT)
Facility: HOSPITAL | Age: 33
End: 2023-07-19
Payer: MEDICARE

## 2023-07-21 ENCOUNTER — HOSPITAL ENCOUNTER (OUTPATIENT)
Facility: HOSPITAL | Age: 33
Setting detail: RECURRING SERIES
Discharge: HOME | End: 2023-07-24
Payer: MEDICARE

## 2023-07-21 PROCEDURE — 97112 NEUROMUSCULAR REEDUCATION: CPT

## 2023-07-21 NOTE — PROGRESS NOTES
eccentric control at knee. Status at IE unable  4. Patient to ambulate 250 ft. with no noted antalgia and with equal WB on kiran LE.   Status at IE mod antalgia    PLAN  [x] Continue plan of care  [x]  Upgrade activities as tolerated  []  Discharge due to :  []  Other:    Chrystie Alamin, PT    7/21/2023    7:59 AM    Future Appointments   Date Time Provider [address]  [address]   7/21/2023  1:40 PM Chrystie Alamin, PT [facility]   7/28/2023 12:20 PM Chrystie Alamin, PT [facility]   8/1/2023 12:20 PM Chrystie Alamin, PT [facility]   8/4/2023  1:00 PM Jose Cheatham, PTA [facility]   8/8/2023  1:40 PM Jose Cheatham, PTA [facility]   8/11/2023 12:20 PM Federico Alamin, PT [facility]   8/15/2023 12:20 PM Jose Cheatham, PTA [facility]   8/18/2023 12:20 PM Nedratie Alamin, PT [facility]   8/22/2023 12:20 PM Jose Cheatham, PTA [facility]   8/25/2023 12:20 PM Chrystie Alamin, PT [facility]

## 2023-07-28 ENCOUNTER — HOSPITAL ENCOUNTER (OUTPATIENT)
Facility: HOSPITAL | Age: 33
Setting detail: RECURRING SERIES
End: 2023-07-28
Payer: MEDICARE

## 2023-08-01 ENCOUNTER — HOSPITAL ENCOUNTER (OUTPATIENT)
Facility: HOSPITAL | Age: 33
Setting detail: RECURRING SERIES
Discharge: HOME | End: 2023-08-04
Payer: MEDICARE

## 2023-08-01 PROCEDURE — 97110 THERAPEUTIC EXERCISES: CPT

## 2023-08-01 PROCEDURE — 97112 NEUROMUSCULAR REEDUCATION: CPT

## 2023-08-01 NOTE — PROGRESS NOTES
with no noted antalgia and with equal WB on kiran LE.   Status at IE: mod antalgia  Current: goal progressing; mod antalgia (8/1/23)    PLAN  [x] Continue plan of care  [x]  Upgrade activities as tolerated  []  Discharge due to :  []  Other:    Randell Gudino PTA    8/1/2023      Future Appointments   Date Time Provider [address]   8/4/2023  1:00 PM Randell Gudino PTA [facility]   8/8/2023  1:40 PM Randell Gudino PTA [facility]   8/11/2023 12:20 PM Aravind Maldonado, PT [facility]   8/15/2023 12:20 PM Randell Gudino, PTA [facility]   8/18/2023 12:20 PM Aravind Maldonado, PT [facility]   8/22/2023 12:20 PM Randell Gudino, PTA [facility]   8/25/2023 12:20 PM Aravind Maldonado, PT [facility]

## 2023-08-04 ENCOUNTER — HOSPITAL ENCOUNTER (OUTPATIENT)
Facility: HOSPITAL | Age: 33
Setting detail: RECURRING SERIES
Discharge: HOME | End: 2023-08-07
Payer: MEDICARE

## 2023-08-04 PROCEDURE — 97112 NEUROMUSCULAR REEDUCATION: CPT

## 2023-08-04 PROCEDURE — 97110 THERAPEUTIC EXERCISES: CPT

## 2023-08-04 NOTE — PROGRESS NOTES
PHYSICAL / OCCUPATIONAL THERAPY - DAILY TREATMENT NOTE (updated )    Patient Name: Tonny Lemons    Date: 2023    : 1990  Insurance: Payor: MEDICARE / Plan: MEDICARE PART A AND B / Product Type: *No Product type* /      Patient  verified yes     Visit #   Current / Total 4 10 Total Time   Time   In / Out 1:00 pm 1:48 pm 48   Pain   In / Out 5/10 3/10    Subjective Functional Status/Changes: \"It always hurts really. \"     TREATMENT AREA =  Left knee pain [M25.562]    OBJECTIVE    Modalities:  Modalities Rationale:     decrease pain and decrease edema to improve patient's ability to progress to PLOF and address remaining functional goals. --  10 min   [x]  Ice     []  Heat    location/position: Left knee, reclined   Skin assessment post-treatment (if applicable):    [x]  intact    []  redness- no adverse reaction                 []redness - adverse reaction:     \    Therapeutic Procedures:  45    Total 38    Total  BC Totals Reminder: bill using total billable min of TIMED therapeutic procedures (example: do not include dry needle or estim unattended, both untimed codes, in totals to left)  8-22 min = 1 unit; 23-37 min = 2 units; 38-52 min = 3 units; 53-67 min = 4 units; 68-82 min = 5 units   Tx Min Billable or 1:1 Min (if diff from Tx Min) Procedure, Rationale, Specifics   14 14 20162 Therapeutic Exercise (timed):  increase ROM, strength, coordination, balance, and proprioception to improve patient's ability to progress to PLOF and address remaining functional goals.  (see flow sheet as applicable)     Details if applicable:  added hamstring strengthening      94544 Neuromuscular Re-Education (timed):  improve balance, coordination, kinesthetic sense, posture, core stability and proprioception to improve patient's ability to develop conscious control of individual muscles and awareness of position of extremities in order to progress to PLOF and address remaining functional

## 2023-08-08 ENCOUNTER — APPOINTMENT (OUTPATIENT)
Facility: HOSPITAL | Age: 33
End: 2023-08-08
Payer: MEDICARE

## 2023-08-11 ENCOUNTER — HOSPITAL ENCOUNTER (OUTPATIENT)
Facility: HOSPITAL | Age: 33
Setting detail: RECURRING SERIES
Discharge: HOME | End: 2023-08-14
Payer: MEDICARE

## 2023-08-11 PROCEDURE — 97112 NEUROMUSCULAR REEDUCATION: CPT

## 2023-08-11 PROCEDURE — 97530 THERAPEUTIC ACTIVITIES: CPT

## 2023-08-11 PROCEDURE — 97110 THERAPEUTIC EXERCISES: CPT

## 2023-08-11 NOTE — PROGRESS NOTES
[address] [facility] [facility] AT [address] [facility]. [place], [address]  Phone: [phone] Fax: [phone]  PROGRESS NOTE  Patient Name: Kathryn Vazquez : 1990   Treatment/Medical Diagnosis: Left knee pain [M25.562]   Referral Source: Rita Vasquez PA-C Payor: Payor: MEDICARE / Plan: MEDICARE PART A AND B / Product Type: *No Product type* /    Date of Initial Visit: 23 Attended Visits: 5 Missed Visits: 1     SUMMARY OF TREATMENT  Patient is a 26 y.o. male who presents to [facility] with Dx of L knee PF lateral tracking. Treatment has consisted of: therapeutic exercise to improve LE/lumbar strength/mobility; therapeutic activities to improve transfer/lift/carry ability; neuromuscular re-education to improve balance, core stability, neuromuscular control with lifting; physical agent/modality for symptom management; manual therapy for symptom relief and muscle flexibility; patient education to improve symptom management; self Care training; home safety training; stair training, and functional mobility training. .    CURRENT STATUS  Patient has attended PT for 5 sessions for the treatment of chronic left knee pain. The patient demonstrates minimal progress at this time, likely secondary to chronicity of condition. He demonstrates adequate LE strength and SLS balance but continues to report primarily pain complaints impairing ease with stair negotiation and squatting. Increased knee pain appreciated with LAQ w/ medial glide.  Additional assessment includes:  Subjective Gains: less sharp pain but more dull pain  Subjective Deficits: continued pain/difficulty with stairs/squatting/walking of any duration  % improvement: \"very little\"  Pain   Average: 10       Best: 3/10     Worst: 7-8/10  Patient Goal: \"feel better\"  Objective Measures:   Knee AAROM:  Left Knee 0-144 deg    LE Strength:   Right (/5) Left (/5)   Hip
soft tissue restrictions, analyze and cue for proper movement patterns, analyze and modify for postural abnormalities, analyze and address imbalance/dizziness, and instruct in home and community integration to address functional deficits and attain remaining goals. Progress toward goals / Updated goals:  []  See Progress Note/Recertification    Short Term Goals: To be accomplished in 2-3 weeks   Patient to be Indep and compliant with initial HEP to address above listed deficits. Status at IE established  Current: established at 2nd visit (7/21/23)  2. Patient to report 7 pain level at worst, to facilitate prolonged walking/standing. IE: 9  Current: progressing, 7-8/10 (8/11/23)   3. Patient able to perform stand to sit with 5 sec eccentric lower to facilitate improved functional quad strength. IE: decreased control and plopped    Long Term Goals: To be accomplished in 24 treatments  1. Patient to be independent & compliant with progressive HEP in preparation for D/C. Status at IE:  Current: reports continued compliance (8/11/23)  2. Patient to increase FOTO score to 56 indicating improved functional abilities and QOL. Status at IE: 35  Current: not met, FOTO 22 pts (8/11/23)  3. Patient to perform a 6 in eccentric tap down with good form and pain no >than 2 to indicate improved eccentric control at knee. Status at IE: unable  Current: able to perform with some control from 4\" step, decreased eccentric control noted from 6\" step; sharp pain throughout (8/11/23)  4. Patient to ambulate 250 ft. with no noted antalgia and with equal WB on ikran LE.   Status at IE: mod antalgia  Current: continues to demonstrate antalgic gait pattern, increased pain noted with even minimal distances    PLAN  [x] Continue plan of care  [x]  Upgrade activities as tolerated  []  Discharge due to :  []  Other:    Serena Henley, PT    8/11/2023    7:13 AM    Future Appointments   Date Time Provider [address]   8/11/2023

## 2023-08-15 ENCOUNTER — APPOINTMENT (OUTPATIENT)
Facility: HOSPITAL | Age: 33
End: 2023-08-15
Payer: MEDICARE

## 2023-08-18 ENCOUNTER — HOSPITAL ENCOUNTER (OUTPATIENT)
Facility: HOSPITAL | Age: 33
Setting detail: RECURRING SERIES
End: 2023-08-18
Payer: MEDICARE

## 2023-08-22 ENCOUNTER — HOSPITAL ENCOUNTER (OUTPATIENT)
Facility: HOSPITAL | Age: 33
Setting detail: RECURRING SERIES
Discharge: HOME | End: 2023-08-25
Payer: MEDICARE

## 2023-08-22 PROCEDURE — 97110 THERAPEUTIC EXERCISES: CPT

## 2023-08-22 PROCEDURE — 97112 NEUROMUSCULAR REEDUCATION: CPT

## 2023-08-22 PROCEDURE — 97530 THERAPEUTIC ACTIVITIES: CPT

## 2023-08-22 NOTE — PROGRESS NOTES
PHYSICAL / OCCUPATIONAL THERAPY - DAILY TREATMENT NOTE (updated )    Patient Name: Ciera Mcbride    Date: 2023    : 1990  Insurance: Payor: MEDICARE / Plan: MEDICARE PART A AND B / Product Type: *No Product type* /      Patient  verified yes     Visit #   Current / Total 1 10 Total Time   Time   In / Out 12:20 pm 1:09 pm 49   Pain   In / Out 2/10 0/10    Subjective Functional Status/Changes: \"Still hurts. \"     TREATMENT AREA =  Left knee pain [M25.562]    OBJECTIVE    Modalities:  Modalities Rationale:     decrease pain and decrease edema to improve patient's ability to progress to PLOF and address remaining functional goals. --  10 min   [x]  Ice     []  Heat    location/position: reclined, left knee   Skin assessment post-treatment (if applicable):    [x]  intact    []  redness- no adverse reaction                 []redness - adverse reaction:           Therapeutic Procedures:  44  Total 33  Total [facility] Totals Reminder: bill using total billable min of TIMED therapeutic procedures (example: do not include dry needle or estim unattended, both untimed codes, in totals to left)  8-22 min = 1 unit; 23-37 min = 2 units; 38-52 min = 3 units; 53-67 min = 4 units; 68-82 min = 5 units   Tx Min Billable or 1:1 Min (if diff from Tx Min) Procedure, Rationale, Specifics   10 4 [id number] Therapeutic Exercise (timed):  increase ROM, strength, coordination, balance, and proprioception to improve patient's ability to progress to PLOF and address remaining functional goals. (see flow sheet as applicable)   Details if applicable:        86909 Therapeutic Activity (timed):  use of dynamic activities replicating functional movements to increase ROM, strength, coordination, balance, and proprioception in order to improve patient's ability to progress to PLOF and address remaining functional goals.   (see flow sheet as applicable)     Details if applicable:  added RDLs     11 11 [id number] Neuromuscular

## 2023-08-25 ENCOUNTER — HOSPITAL ENCOUNTER (OUTPATIENT)
Facility: HOSPITAL | Age: 33
Setting detail: RECURRING SERIES
Discharge: HOME | End: 2023-08-28
Payer: MEDICARE

## 2023-08-25 PROCEDURE — 97110 THERAPEUTIC EXERCISES: CPT

## 2023-08-25 PROCEDURE — 97140 MANUAL THERAPY 1/> REGIONS: CPT

## 2023-08-25 NOTE — PROGRESS NOTES
PHYSICAL / OCCUPATIONAL THERAPY - DAILY TREATMENT NOTE (updated )    Patient Name: Winifred Soto    Date: 2023    : 1990  Insurance: Payor: MEDICARE / Plan: MEDICARE PART A AND B / Product Type: *No Product type* /      Patient  verified yes     Visit #   Current / Total 2 10 Total Time   Time   In / Out 12:20 1\"12 52   Pain   In / Out 5 0    Subjective Functional Status/Changes: The pain is still there a lot, but more dull now. TREATMENT AREA =  Left knee pain [M25.562]    OBJECTIVE    Modalities:  Modalities Rationale:     decrease pain and decrease edema to improve patient's ability to progress to PLOF and address remaining functional goals. --  10 min   [x]  Ice     []  Heat    location/position: reclined, left knee   Skin assessment post-treatment (if applicable):    [x]  intact    []  redness- no adverse reaction                 []redness - adverse reaction:           Therapeutic Procedures:  42  Total 38  Total [facility] Totals Reminder: bill using total billable min of TIMED therapeutic procedures (example: do not include dry needle or estim unattended, both untimed codes, in totals to left)  8-22 min = 1 unit; 23-37 min = 2 units; 38-52 min = 3 units; 53-67 min = 4 units; 68-82 min = 5 units   Tx Min Billable or 1:1 Min (if diff from Tx Min) Procedure, Rationale, Specifics   03 [phone] [id number] Therapeutic Exercise (timed):  increase ROM, strength, coordination, balance, and proprioception to improve patient's ability to progress to PLOF and address remaining functional goals. (see flow sheet as applicable)   Details if applicable:       10 10 21868 Manual Therapy (timed):  decrease pain, increase ROM, increase tissue extensibility, decrease trigger points, and increase postural awareness to improve patient's ability to progress to PLOF and address remaining functional goals.   The manual therapy interventions were performed at a separate and distinct time from the therapeutic